# Patient Record
Sex: MALE | Race: WHITE | URBAN - METROPOLITAN AREA
[De-identification: names, ages, dates, MRNs, and addresses within clinical notes are randomized per-mention and may not be internally consistent; named-entity substitution may affect disease eponyms.]

---

## 2021-03-30 ENCOUNTER — IMMUNIZATIONS (OUTPATIENT)
Dept: FAMILY MEDICINE CLINIC | Facility: HOSPITAL | Age: 51
End: 2021-03-30

## 2021-03-30 DIAGNOSIS — Z23 ENCOUNTER FOR IMMUNIZATION: Primary | ICD-10-CM

## 2021-03-30 PROCEDURE — 0001A SARS-COV-2 / COVID-19 MRNA VACCINE (PFIZER-BIONTECH) 30 MCG: CPT

## 2021-03-30 PROCEDURE — 91300 SARS-COV-2 / COVID-19 MRNA VACCINE (PFIZER-BIONTECH) 30 MCG: CPT

## 2021-04-21 ENCOUNTER — IMMUNIZATIONS (OUTPATIENT)
Dept: FAMILY MEDICINE CLINIC | Facility: HOSPITAL | Age: 51
End: 2021-04-21

## 2021-04-21 DIAGNOSIS — Z23 ENCOUNTER FOR IMMUNIZATION: Primary | ICD-10-CM

## 2021-04-21 PROCEDURE — 0002A SARS-COV-2 / COVID-19 MRNA VACCINE (PFIZER-BIONTECH) 30 MCG: CPT

## 2021-04-21 PROCEDURE — 91300 SARS-COV-2 / COVID-19 MRNA VACCINE (PFIZER-BIONTECH) 30 MCG: CPT

## 2022-07-09 ENCOUNTER — EMERGENCY (EMERGENCY)
Facility: HOSPITAL | Age: 52
LOS: 1 days | Discharge: HOME | End: 2022-07-09
Attending: EMERGENCY MEDICINE
Payer: COMMERCIAL

## 2022-07-09 VITALS
TEMPERATURE: 98 F | OXYGEN SATURATION: 99 % | HEART RATE: 56 BPM | WEIGHT: 190.04 LBS | HEIGHT: 71 IN | SYSTOLIC BLOOD PRESSURE: 117 MMHG | DIASTOLIC BLOOD PRESSURE: 80 MMHG | RESPIRATION RATE: 17 BRPM

## 2022-07-09 VITALS
OXYGEN SATURATION: 99 % | HEART RATE: 48 BPM | DIASTOLIC BLOOD PRESSURE: 84 MMHG | RESPIRATION RATE: 18 BRPM | SYSTOLIC BLOOD PRESSURE: 127 MMHG

## 2022-07-09 LAB
ALBUMIN SERPL ELPH-MCNC: 4.8 G/DL — SIGNIFICANT CHANGE UP (ref 3.3–5)
ALP SERPL-CCNC: 60 U/L — SIGNIFICANT CHANGE UP (ref 40–120)
ALT FLD-CCNC: 24 U/L — SIGNIFICANT CHANGE UP (ref 10–45)
ANION GAP SERPL CALC-SCNC: 10 MMOL/L — SIGNIFICANT CHANGE UP (ref 5–17)
AST SERPL-CCNC: 21 U/L — SIGNIFICANT CHANGE UP (ref 10–40)
B BURGDOR C6 AB SER-ACNC: NEGATIVE — SIGNIFICANT CHANGE UP
B BURGDOR IGG+IGM SER-ACNC: 0.2 INDEX — SIGNIFICANT CHANGE UP (ref 0.01–0.89)
BILIRUB SERPL-MCNC: 0.3 MG/DL — SIGNIFICANT CHANGE UP (ref 0.2–1.2)
BUN SERPL-MCNC: 19 MG/DL — SIGNIFICANT CHANGE UP (ref 7–23)
CALCIUM SERPL-MCNC: 9.8 MG/DL — SIGNIFICANT CHANGE UP (ref 8.4–10.5)
CHLORIDE SERPL-SCNC: 102 MMOL/L — SIGNIFICANT CHANGE UP (ref 96–108)
CO2 SERPL-SCNC: 26 MMOL/L — SIGNIFICANT CHANGE UP (ref 22–31)
CREAT SERPL-MCNC: 0.73 MG/DL — SIGNIFICANT CHANGE UP (ref 0.5–1.3)
EGFR: 110 ML/MIN/1.73M2 — SIGNIFICANT CHANGE UP
GLUCOSE SERPL-MCNC: 99 MG/DL — SIGNIFICANT CHANGE UP (ref 70–99)
HCT VFR BLD CALC: 41.1 % — SIGNIFICANT CHANGE UP (ref 39–50)
HGB BLD-MCNC: 13.6 G/DL — SIGNIFICANT CHANGE UP (ref 13–17)
MCHC RBC-ENTMCNC: 26.4 PG — LOW (ref 27–34)
MCHC RBC-ENTMCNC: 33.1 GM/DL — SIGNIFICANT CHANGE UP (ref 32–36)
MCV RBC AUTO: 79.7 FL — LOW (ref 80–100)
NRBC # BLD: 0 /100 WBCS — SIGNIFICANT CHANGE UP (ref 0–0)
PLATELET # BLD AUTO: 173 K/UL — SIGNIFICANT CHANGE UP (ref 150–400)
POTASSIUM SERPL-MCNC: 5.3 MMOL/L — SIGNIFICANT CHANGE UP (ref 3.5–5.3)
POTASSIUM SERPL-SCNC: 5.3 MMOL/L — SIGNIFICANT CHANGE UP (ref 3.5–5.3)
PROT SERPL-MCNC: 7.4 G/DL — SIGNIFICANT CHANGE UP (ref 6–8.3)
RBC # BLD: 5.16 M/UL — SIGNIFICANT CHANGE UP (ref 4.2–5.8)
RBC # FLD: 12.8 % — SIGNIFICANT CHANGE UP (ref 10.3–14.5)
SODIUM SERPL-SCNC: 138 MMOL/L — SIGNIFICANT CHANGE UP (ref 135–145)
WBC # BLD: 5.32 K/UL — SIGNIFICANT CHANGE UP (ref 3.8–10.5)
WBC # FLD AUTO: 5.32 K/UL — SIGNIFICANT CHANGE UP (ref 3.8–10.5)

## 2022-07-09 PROCEDURE — 99284 EMERGENCY DEPT VISIT MOD MDM: CPT | Mod: 25

## 2022-07-09 PROCEDURE — 85027 COMPLETE CBC AUTOMATED: CPT

## 2022-07-09 PROCEDURE — 36415 COLL VENOUS BLD VENIPUNCTURE: CPT

## 2022-07-09 PROCEDURE — 93005 ELECTROCARDIOGRAM TRACING: CPT

## 2022-07-09 PROCEDURE — 86618 LYME DISEASE ANTIBODY: CPT

## 2022-07-09 PROCEDURE — 93010 ELECTROCARDIOGRAM REPORT: CPT | Mod: NC

## 2022-07-09 PROCEDURE — 87476 LYME DIS DNA AMP PROBE: CPT

## 2022-07-09 PROCEDURE — 99283 EMERGENCY DEPT VISIT LOW MDM: CPT

## 2022-07-09 PROCEDURE — 80053 COMPREHEN METABOLIC PANEL: CPT

## 2022-07-09 RX ADMIN — Medication 100 MILLIGRAM(S): at 12:07

## 2022-07-09 NOTE — ED PROVIDER NOTE - PATIENT PORTAL LINK FT
You can access the FollowMyHealth Patient Portal offered by [facility] by registering at the following website: [email or web address]. By joining [facility]’s FollowMyHealth portal, you will also be able to view your health information using other applications (apps) compatible with our system.

## 2022-07-09 NOTE — ED PROVIDER NOTE - ATTENDING CONTRIBUTION TO CARE
51yr M hx of HL on atorvastatin p/w tick bites. reports going for a run up in [facility] and then realized he had a tick bite, called his PCP and was prescribed a single ppx dose of doxy. today 7 days after initial bite, noticed one in his rt groin and when tried to grab it burst. noticed some blood. inspected his body again and could not find another one. denies f/c/fatigue, cp sob abd pain n.v diarrhea.  exam notable for well appearing, neuro grossly nl, clear lungs S1-2, no abd ttp or organomegaly. no peripheral edema.  discussed with pt regarding risks and benefits of a course of therapy and made a shared decision to start treatment. will f/u with his primary

## 2022-07-09 NOTE — ED PROVIDER NOTE - PHYSICAL EXAMINATION
Const: Well-nourished, Well-developed, appearing stated age.  Eyes: no conjunctival injection, and symmetrical lids.  HEENT: Head NCAT, no lesions. Atraumatic external nose and ears. Moist MM.  Neck: Symmetric, trachea midline.   CVS: +S1/S2,    RESP: Unlabored respiratory effort. Clear to auscultation bilaterally.  GI: Nontender/Nondistended   MSK: Normocephalic/Atraumatic   Skin: No targetoid rash. 1mm erythema to right thigh at site of tick bite, no residual tick parts.   Neuro: CNs II-XII grossly intact. Motor & Sensation grossly intact.  Psych: Awake, Alert, & Oriented (AAO) x3. Appropriate mood and affect.

## 2022-07-09 NOTE — ED PROVIDER NOTE - CLINICAL SUMMARY MEDICAL DECISION MAKING FREE TEXT BOX
Pt is a 51 m who presents with tick bite where tick was engorged and he removed today. will get lyme titers and start doxy course for repeated exposure.

## 2022-07-09 NOTE — ED PROVIDER NOTE - PROGRESS NOTE DETAILS
Jose Hoskins, PGY1 pt given doxy and lyme titers taken. pt would like to go home and check his Mychart for his blood work results. will dc with pmd f/u Jose Hoskins, PGY1 pt given doxy and lyme titers taken. ECG shows HR in 40's which pt states that he sees a cardiologist and this is his baseline.  pt would like to go home and check his Mychart for his blood work results. will dc with pmd f/u

## 2022-07-09 NOTE — ED PROVIDER NOTE - NSFOLLOWUPINSTRUCTIONS_ED_ALL_ED_FT
You were seen in the hospital for a tick bite.   Please  your medication from the pharmacy and take your antibiotic course.   Please look out for a target-like rash on your body as we discussed.  Please see your primary doctor in 2-3 days for follow-up care. Return to ER for any new or worsening symptoms including fevers or chills, nausea or vomiting, joint pains, or palpitations  Please read the handout on Tick Bites.   Check your mychart or call the hospital for your lab results.

## 2022-07-09 NOTE — ED ADULT NURSE REASSESSMENT NOTE - NS ED NURSE REASSESS COMMENT FT1
Pt for discharged HR is 48, clinically asymptomatic. States "my HR is always low, I'm always 48-52, because a I ran 10 miles everyday." MD Hoskins aware. States it's okay for pt to go home"

## 2022-07-09 NOTE — ED ADULT NURSE NOTE - OBJECTIVE STATEMENT
Male 51 years old alert and orientex4 came in for tick bite. Pt noticed tick this morning on his right inner thigh and he removed it. Has a tick on side of his left knee last week and was given 200 mg of Doxycycline. Denies fever, chest pain, sob, muscle pain or palpitations.

## 2022-07-09 NOTE — ED PROVIDER NOTE - OBJECTIVE STATEMENT
Pt is a 51 m who presents with tick bite where tick was engorged and he removed today. Pt denies any n/v, no f/c, no myalgia, cp, palpitations. Pt had a tick last week as well and was given 1 dose 200 of doxy.

## 2022-07-13 LAB — B BURGDOR DNA SPEC QL NAA+PROBE: NEGATIVE — SIGNIFICANT CHANGE UP

## 2024-06-18 DIAGNOSIS — Z00.00 ENCOUNTER FOR PREVENTIVE HEALTH EXAMINATION: Primary | ICD-10-CM

## 2024-09-05 ENCOUNTER — HOSPITAL ENCOUNTER (OUTPATIENT)
Dept: ULTRASOUND IMAGING | Facility: HOSPITAL | Age: 54
Discharge: HOME | End: 2024-09-05

## 2024-09-05 ENCOUNTER — OFFICE VISIT (OUTPATIENT)
Dept: FAMILY MEDICINE CLINIC | Facility: CLINIC | Age: 54
End: 2024-09-05

## 2024-09-05 ENCOUNTER — HOSPITAL ENCOUNTER (OUTPATIENT)
Dept: VASCULAR ULTRASOUND | Facility: HOSPITAL | Age: 54
Discharge: HOME | End: 2024-09-05

## 2024-09-05 ENCOUNTER — HOSPITAL ENCOUNTER (OUTPATIENT)
Dept: NON INVASIVE DIAGNOSTICS | Facility: CLINIC | Age: 54
Discharge: HOME | End: 2024-09-05

## 2024-09-05 ENCOUNTER — LAB (OUTPATIENT)
Dept: LAB | Facility: CLINIC | Age: 54
End: 2024-09-05

## 2024-09-05 ENCOUNTER — HOSPITAL ENCOUNTER (OUTPATIENT)
Dept: CT IMAGING | Facility: HOSPITAL | Age: 54
Discharge: HOME | End: 2024-09-05

## 2024-09-05 VITALS
DIASTOLIC BLOOD PRESSURE: 58 MMHG | SYSTOLIC BLOOD PRESSURE: 110 MMHG | HEIGHT: 70 IN | RESPIRATION RATE: 14 BRPM | HEART RATE: 49 BPM | TEMPERATURE: 97.8 F | WEIGHT: 198 LBS | BODY MASS INDEX: 28.35 KG/M2 | OXYGEN SATURATION: 100 %

## 2024-09-05 VITALS
HEART RATE: 49 BPM | BODY MASS INDEX: 28.34 KG/M2 | HEIGHT: 70 IN | WEIGHT: 197.97 LBS | SYSTOLIC BLOOD PRESSURE: 110 MMHG | DIASTOLIC BLOOD PRESSURE: 58 MMHG

## 2024-09-05 DIAGNOSIS — E78.00 PURE HYPERCHOLESTEROLEMIA: ICD-10-CM

## 2024-09-05 DIAGNOSIS — R73.03 PREDIABETES: ICD-10-CM

## 2024-09-05 DIAGNOSIS — Z00.00 ENCOUNTER FOR PREVENTIVE HEALTH EXAMINATION: ICD-10-CM

## 2024-09-05 DIAGNOSIS — E55.9 VITAMIN D DEFICIENCY: ICD-10-CM

## 2024-09-05 DIAGNOSIS — I65.23 CAROTID STENOSIS, ASYMPTOMATIC, BILATERAL: ICD-10-CM

## 2024-09-05 DIAGNOSIS — Z00.00 WELL ADULT EXAM: Primary | ICD-10-CM

## 2024-09-05 DIAGNOSIS — R82.90 ABNORMAL FINDING ON URINALYSIS: ICD-10-CM

## 2024-09-05 LAB
25(OH)D3 SERPL-MCNC: 24.9 NG/ML (ref 30–100)
ALBUMIN SERPL BCG-MCNC: 4.2 G/DL (ref 3.5–5)
ALP SERPL-CCNC: 38 U/L (ref 34–104)
ALT SERPL W P-5'-P-CCNC: 16 U/L (ref 7–52)
ANION GAP SERPL CALCULATED.3IONS-SCNC: 2 MMOL/L (ref 4–13)
AORTIC ROOT: 3.2 CM
APICAL FOUR CHAMBER EJECTION FRACTION: 73 %
ASCENDING AORTA: 3 CM
AST SERPL W P-5'-P-CCNC: 14 U/L (ref 13–39)
ATRIAL RATE: 49 BPM
BACTERIA UR QL AUTO: ABNORMAL /HPF
BASOPHILS # BLD AUTO: 0.02 THOUSANDS/ÂΜL (ref 0–0.1)
BASOPHILS NFR BLD AUTO: 0 % (ref 0–1)
BILIRUB SERPL-MCNC: 0.44 MG/DL (ref 0.2–1)
BILIRUB UR QL STRIP: NEGATIVE
BSA FOR ECHO PROCEDURE: 2.08 M2
BUN SERPL-MCNC: 17 MG/DL (ref 5–25)
CALCIUM SERPL-MCNC: 9.1 MG/DL (ref 8.4–10.2)
CHLORIDE SERPL-SCNC: 105 MMOL/L (ref 96–108)
CHOLEST SERPL-MCNC: 146 MG/DL
CLARITY UR: CLEAR
CO2 SERPL-SCNC: 28 MMOL/L (ref 21–32)
COLOR UR: ABNORMAL
CREAT SERPL-MCNC: 0.72 MG/DL (ref 0.6–1.3)
CRP SERPL HS-MCNC: 1.82 MG/L
E WAVE DECELERATION TIME: 129 MS
E/A RATIO: 1.25
EOSINOPHIL # BLD AUTO: 0.18 THOUSAND/ÂΜL (ref 0–0.61)
EOSINOPHIL NFR BLD AUTO: 4 % (ref 0–6)
ERYTHROCYTE [DISTWIDTH] IN BLOOD BY AUTOMATED COUNT: 13.1 % (ref 11.6–15.1)
EST. AVERAGE GLUCOSE BLD GHB EST-MCNC: 117 MG/DL
FRACTIONAL SHORTENING: 40 (ref 28–44)
GFR SERPL CREATININE-BSD FRML MDRD: 105 ML/MIN/1.73SQ M
GLUCOSE P FAST SERPL-MCNC: 97 MG/DL (ref 65–99)
GLUCOSE UR STRIP-MCNC: NEGATIVE MG/DL
HBA1C MFR BLD: 5.7 %
HCT VFR BLD AUTO: 41.5 % (ref 36.5–49.3)
HCV AB SER QL: NORMAL
HDLC SERPL-MCNC: 36 MG/DL
HGB BLD-MCNC: 13.4 G/DL (ref 12–17)
HGB UR QL STRIP.AUTO: NEGATIVE
IMM GRANULOCYTES # BLD AUTO: 0.02 THOUSAND/UL (ref 0–0.2)
IMM GRANULOCYTES NFR BLD AUTO: 0 % (ref 0–2)
INTERVENTRICULAR SEPTUM IN DIASTOLE (PARASTERNAL SHORT AXIS VIEW): 0.9 CM
INTERVENTRICULAR SEPTUM: 0.9 CM (ref 0.6–1.1)
KETONES UR STRIP-MCNC: NEGATIVE MG/DL
LAAS-AP2: 18.6 CM2
LAAS-AP4: 18.4 CM2
LDLC SERPL CALC-MCNC: 85 MG/DL (ref 0–100)
LEFT ATRIUM AREA SYSTOLE SINGLE PLANE A4C: 17.9 CM2
LEFT ATRIUM SIZE: 4 CM
LEFT ATRIUM VOLUME (MOD BIPLANE): 54 ML
LEFT INTERNAL DIMENSION IN SYSTOLE: 2.7 CM (ref 2.1–4)
LEFT VENTRICULAR INTERNAL DIMENSION IN DIASTOLE: 4.5 CM (ref 3.5–6)
LEFT VENTRICULAR POSTERIOR WALL IN END DIASTOLE: 0.9 CM
LEFT VENTRICULAR STROKE VOLUME: 66 ML
LEUKOCYTE ESTERASE UR QL STRIP: NEGATIVE
LVSV (TEICH): 66 ML
LYMPHOCYTES # BLD AUTO: 1.65 THOUSANDS/ÂΜL (ref 0.6–4.47)
LYMPHOCYTES NFR BLD AUTO: 33 % (ref 14–44)
MAX HR PERCENT: 93 %
MAX HR: 155 BPM
MCH RBC QN AUTO: 26.9 PG (ref 26.8–34.3)
MCHC RBC AUTO-ENTMCNC: 32.3 G/DL (ref 31.4–37.4)
MCV RBC AUTO: 83 FL (ref 82–98)
MONOCYTES # BLD AUTO: 0.42 THOUSAND/ÂΜL (ref 0.17–1.22)
MONOCYTES NFR BLD AUTO: 8 % (ref 4–12)
MUCOUS THREADS UR QL AUTO: ABNORMAL
MV E'TISSUE VEL-SEP: 12 CM/S
MV PEAK A VEL: 0.67 M/S
MV PEAK E VEL: 84 CM/S
MV STENOSIS PRESSURE HALF TIME: 37 MS
MV VALVE AREA P 1/2 METHOD: 5.9
NEUTROPHILS # BLD AUTO: 2.7 THOUSANDS/ÂΜL (ref 1.85–7.62)
NEUTS SEG NFR BLD AUTO: 55 % (ref 43–75)
NITRITE UR QL STRIP: NEGATIVE
NON-SQ EPI CELLS URNS QL MICRO: ABNORMAL /HPF
NRBC BLD AUTO-RTO: 0 /100 WBCS
P AXIS: 72 DEGREES
PH UR STRIP.AUTO: 5.5 [PH]
PLATELET # BLD AUTO: 167 THOUSANDS/UL (ref 149–390)
PMV BLD AUTO: 10.9 FL (ref 8.9–12.7)
POST LVEF: 70 %
POTASSIUM SERPL-SCNC: 5.2 MMOL/L (ref 3.5–5.3)
PR INTERVAL: 190 MS
PROT SERPL-MCNC: 6.8 G/DL (ref 6.4–8.4)
PROT UR STRIP-MCNC: NEGATIVE MG/DL
PSA SERPL-MCNC: 0.72 NG/ML (ref 0–4)
QRS AXIS: 24 DEGREES
QRSD INTERVAL: 110 MS
QT INTERVAL: 426 MS
QTC INTERVAL: 384 MS
RATE PRESSURE PRODUCT: NORMAL
RBC # BLD AUTO: 4.99 MILLION/UL (ref 3.88–5.62)
RBC #/AREA URNS AUTO: ABNORMAL /HPF
RIGHT ATRIUM AREA SYSTOLE A4C: 13.2 CM2
RIGHT VENTRICLE ID DIMENSION: 3.1 CM
SL CV LEFT ATRIUM LENGTH A2C: 5.2 CM
SL CV LV EF: 60
SL CV LV EF: 60
SL CV PED ECHO LEFT VENTRICLE DIASTOLIC VOLUME (MOD BIPLANE) 2D: 94 ML
SL CV PED ECHO LEFT VENTRICLE SYSTOLIC VOLUME (MOD BIPLANE) 2D: 28 ML
SL CV STRESS RECOVERY BP: NORMAL MMHG
SL CV STRESS RECOVERY HR: 64 BPM
SL CV STRESS RECOVERY O2 SAT: 100 %
SL CV STRESS STAGE REACHED: 5
SODIUM SERPL-SCNC: 135 MMOL/L (ref 135–147)
SP GR UR STRIP.AUTO: 1.02 (ref 1–1.03)
STRESS ANGINA INDEX: 0
STRESS BASELINE BP: NORMAL MMHG
STRESS BASELINE HR: 48 BPM
STRESS O2 SAT REST: 100 %
STRESS PEAK HR: 153 BPM
STRESS POST ESTIMATED WORKLOAD: 14.2 METS
STRESS POST EXERCISE DUR MIN: 8 MIN
STRESS POST EXERCISE DUR SEC: 30 SEC
STRESS POST O2 SAT PEAK: 100 %
STRESS POST PEAK BP: 162 MMHG
T WAVE AXIS: 54 DEGREES
TRICUSPID ANNULAR PLANE SYSTOLIC EXCURSION: 2.3 CM
TRIGL SERPL-MCNC: 125 MG/DL
TSH SERPL DL<=0.05 MIU/L-ACNC: 2.14 UIU/ML (ref 0.45–4.5)
UROBILINOGEN UR STRIP-ACNC: <2 MG/DL
VENTRICULAR RATE: 49 BPM
WBC # BLD AUTO: 4.99 THOUSAND/UL (ref 4.31–10.16)
WBC #/AREA URNS AUTO: ABNORMAL /HPF

## 2024-09-05 PROCEDURE — 84153 ASSAY OF PSA TOTAL: CPT

## 2024-09-05 PROCEDURE — 93350 STRESS TTE ONLY: CPT

## 2024-09-05 PROCEDURE — 93306 TTE W/DOPPLER COMPLETE: CPT

## 2024-09-05 PROCEDURE — 80053 COMPREHEN METABOLIC PANEL: CPT

## 2024-09-05 PROCEDURE — 93922 UPR/L XTREMITY ART 2 LEVELS: CPT

## 2024-09-05 PROCEDURE — 84443 ASSAY THYROID STIM HORMONE: CPT

## 2024-09-05 PROCEDURE — VASC: Performed by: SURGERY

## 2024-09-05 PROCEDURE — 83036 HEMOGLOBIN GLYCOSYLATED A1C: CPT

## 2024-09-05 PROCEDURE — 86803 HEPATITIS C AB TEST: CPT

## 2024-09-05 PROCEDURE — 82306 VITAMIN D 25 HYDROXY: CPT

## 2024-09-05 PROCEDURE — 80061 LIPID PANEL: CPT

## 2024-09-05 PROCEDURE — 93306 TTE W/DOPPLER COMPLETE: CPT | Performed by: INTERNAL MEDICINE

## 2024-09-05 PROCEDURE — 36415 COLL VENOUS BLD VENIPUNCTURE: CPT

## 2024-09-05 PROCEDURE — 76700 US EXAM ABDOM COMPLETE: CPT

## 2024-09-05 PROCEDURE — 93010 ELECTROCARDIOGRAM REPORT: CPT | Performed by: INTERNAL MEDICINE

## 2024-09-05 PROCEDURE — 85025 COMPLETE CBC W/AUTO DIFF WBC: CPT

## 2024-09-05 PROCEDURE — 93005 ELECTROCARDIOGRAM TRACING: CPT

## 2024-09-05 PROCEDURE — 99499EX: Performed by: INTERNAL MEDICINE

## 2024-09-05 PROCEDURE — 81001 URINALYSIS AUTO W/SCOPE: CPT

## 2024-09-05 PROCEDURE — 86141 C-REACTIVE PROTEIN HS: CPT

## 2024-09-05 PROCEDURE — 93350 STRESS TTE ONLY: CPT | Performed by: INTERNAL MEDICINE

## 2024-09-05 RX ORDER — ACETAMINOPHEN 160 MG
2000 TABLET,DISINTEGRATING ORAL DAILY
Start: 2024-09-05

## 2024-09-05 RX ORDER — ATORVASTATIN CALCIUM 10 MG/1
10 TABLET, FILM COATED ORAL DAILY
COMMUNITY
Start: 2016-03-26

## 2024-09-05 NOTE — PROGRESS NOTES
[facility] Physical Exam     Arelis Mayes is a 54 y.o. male who is presenting for an [facility] Physical Exam at [facility]. This is Arelis's first [facility] physical. Mr. Mayes is a . Arelis had a previous executive physical at [facility] in [place] in 2022, which included an EKG, echocardiogram and a coronary CT. The coronary CT score was 0.     Arelis has enjoyed good health overall throughout his life. His past medical history is significant for hyperlipemia, obstructive sleep apnea, obesity and cholecystitis. Hyperlipidemia is well controlled with lifestyle improvement and Atorvastatin 10 mg per day. Obstructive Sleep Apnea is well controlled with weight loss and previous uvulectomy/palatoplasty. He had uncomplicated Hepatitis A as a child.    Past surgical history includes a laparoscopic cholecystectomy in 2010 and uvulopalatoplasty in 2015. Arelis underwent a screening colonoscopy in 2022.     His family history is significant for coronary artery disease requiring PTCA in his father and cancer of the bladder, gallbladder, dementia and autoimmune disease with associated thrombocytopenia in his mother. Both of his parents are alive and in their 90's. Arelis has 2 brothers, one of whom has hypertension. His paternal grandfather has a history of prostate cancer.    Arelis is . His wife is a retired Obstetrician/Gynecologist. They have a son and a daughter who are in grade school. He has no history of smoking but had second hand smoke exposure from his mother, which ended 35 years ago. Alcohol intake is 1 drink every 2 weeks. Caffeine intake is 2-3 espresso's per day. He runs 6-7 miles 5-6 days per week and lifts weights. Arelis's diet is healthy, including little red meat and plenty of vegetables and fruit.     Allergies: no know allergies to medications    Medications: Atorvastatin 10 mg orally once a day    Immunizations: Hepatitis B: 1999;  "TdaP: 2022; Shingrix x1: 8/2022    Review of Systems   Constitutional: Negative.    HENT: Negative.     Eyes: Negative.    Respiratory:  Negative for apnea, cough, chest tightness and shortness of breath.         Does snore, but no witnessed apnea.    Cardiovascular:  Negative for chest pain, palpitations and leg swelling.        History of atypical chest pain. History of asymptomatic bradycardia due to conditioning.    Gastrointestinal: Negative.    Musculoskeletal: Negative.    Skin:         Recent blood blister of bottom of foot, with exam in [place] by a Dermatologist while traveling recently.    Allergic/Immunologic: Negative for environmental allergies, food allergies and immunocompromised state.   Neurological:  Negative for headaches.   Hematological: Negative.    Psychiatric/Behavioral:  Positive for sleep disturbance. Negative for dysphoric mood. The patient is not nervous/anxious.         Occasional insomnia         PHQ-2/9 Depression Screening    Little interest or pleasure in doing things: 0 - not at all  Feeling down, depressed, or hopeless: 0 - not at all  PHQ-2 Score: 0  PHQ-2 Interpretation: Negative depression screen           Active Ambulatory Problems     Diagnosis Date Noted    Pure hypercholesterolemia 09/05/2024     Resolved Ambulatory Problems     Diagnosis Date Noted    No Resolved Ambulatory Problems     No Additional Past Medical History       No past surgical history on file.    No family history on file.    Social History     Tobacco Use   Smoking Status Not on file   Smokeless Tobacco Not on file         Current Outpatient Medications:     atorvastatin (LIPITOR) 10 mg tablet, Take 10 mg by mouth daily, Disp: , Rfl:           Objective:         Physical Exam      BP: 110/58  P: 49  RR: 14  T: 97.8  O2%: 100    Ht: 5'10\" Wt: 198.0 lbs      VA unaided:  OD: 20/20   OS: 20/30    Pleasant, alert and oriented and in no acute distress.  Affect: normal  HEENT:  NCAT  No adenopathy " throughout  Scalp normal  Eyes:  Pupils 3 mm with normal direct and consensual light responses, extraocular eye movements intact. There is no conjunctival pallor or scleral icterus.  Ophthalmoscopic exam: no iritis or conjunctivitis, normal anterior chambers, no opacities, normal vasculature, no hemorrhages or exudates, normal optic disc  Ears:  No deformity of external ears.  No pain on manipulation of the tragus.  No mastoid redness, swelling, tenderness or drainage.  External auditory canals: No abnormalities  Tympanic membranes:  No trauma, no retraction or rupture, no bulging or erythema, no air-fluid level, no cholesteatoma  Nose:  No trauma or deformity.  The canals are patent with normal mucous membranes, no polyps or masses. There is no nasal discharge. There is no septal deviation.  Oral cavity and throat:  Normal dentition with no evidence of gum disease. mucous membranes are normal.  Normal appearance of Stensen's and Lui's ducts.  The airway is patent.  There is no swelling or other abnormality of the uvula  Salivary glands:  Normal on inspection and palpation.  There are no hemorrhages or exudates of the throat.  Tonsils are involuted.  There is no submandibular adenopathy.  There is no submental adenopathy.    Neck:  Supple, full range of motion, nontender, no deformity or trauma.  There is no cervical adenopathy. There is no supraclavicular adenopathy  Carotid arteries:  Normal upstroke and descent without bruit.  There is no JVD.  Trachea is midline without stridor.  Thyroid:  Normal on inspection and palpation.    Chest:  No trauma or deformity, no supraclavicular adenopathy, no subclavian bruits.  Surgical scars: None.  Chest wall expansion is symmetric normal.  Lungs:  There is no tachypnea or dyspnea, no use of accessory muscles of respiration.  Breath sounds are normal and equal throughout, expiratory phase of respiration is not prolonged.  Exam includes no wheezing or rales, no focal  decreased breath sounds, no dullness to percussion.    Heart:  Regular rate and rhythm, normal S1 and S2, no S4 or S3. Murmurs:  None.  No cardiac rub.  PMI 5th intercostal space, midclavicular line, no right ventricular heave.    Back:  No trauma, no kyphosis or scoliosis, no CVA mass or tenderness, no spinal tenderness.    Abdomen:  Nondistended, normal bowel sounds, soft, nontender without masses or organomegaly. There is no pulsatile mass or bruit.  Hernias: none  Surgical Scars: horizontal transverse mid- line high epigastric scar with keloid formation    Hypogastric:  No distention, no tympany or tenderness over the bladder, no mass.  Inguinal Region: There is no inguinal mass or adenopathy, no testicular mass      Arterial circulation:  +2/2 brachial, radial and ulnar pulses bilaterally. +2/2 femoral, popliteal, posterior tibial and dorsalis pedis pulses bilaterally.    Extremities:  No clubbing or cyanosis are present.  There is no upper extremity edema. There is no lower extremity edema. there are no varicosities   bilaterally. No phlebitic findings or calf tenderness.    Joints: no significant deformity, no synovitis, normal function, no tophi    Skin:  No rash or skin malignancy.  Nails:  No mycotic changes or other abnormalities.    Neurologic:  Cranial nerves 2-12 are grossly intact, there is no dysmetria, there is no neurologic gait abnormality.  There is no spasticity.  There is no tremor.  There is no masked faces, no bradykinesia or rigidity.  Sensory deficit: None.  Strength symmetric normal 5/5 throughout.  Reflexes:  +2 biceps, triceps, patellar and Achilles, Babinski response downgoing bilaterally.    Cognitive exam:  orientation as above.  Executive functions:  intact. Short-term memory:  intact.    Long-term memory:  intact. Language: fluent.             Assessment/Plan:     1. Well adult exam    Ramzi, your health is very good, as is your lifestyle. Continue your running program, low  carbohydrate intake and intake of lean proteins and vegetables.     Please refer to the notes from Dermatology and Cardiology as well.     I reviewed your vaccine records. I recommend that you receive a Flu shot this Fall. I also recommend re vaccination with the Shingrix 2 step vaccine series. This is available at your local pharmacy.     Given your family history of prostate cancer in your grandfather, continue annual PSA measurements.     Given that your colonoscopy in 2022 was normal, this test should be repeated next in 2032.     As we discussed, I would like to see you in my office for a follow up visit in 6 months. My office will contact you to schedule this visit. I have entered your lab orders for this visit. I would like you to have them performed about 1 week prior to your next visit.      2. Pure hypercholesterolemia    Continue Atorvastatin 10 mg per day.  We will recheck your lipid panel in 6 months.  Repeat coronary calcium measurements every 5 years (next due on 2027).    3. Vitamin D deficiency    Your Vitamin D level is slightly low at 24.9.  Start taking over the counter Vitamin D3, 2000 IU per day.       4. Prediabetes    Your HbA1c is just above the ideal range. Your risk for progression to diabetes is low. Please make adjustments in your diet as recommended by the dietitian. Continue regular exercise. We will recheck this test again in 6 months.         5. Carotid stenosis, asymptomatic, bilateral  You have mild plaque deposition in your carotid arteries. To prevent progression, continue Atorvastatin, regular exercise, good blood pressure control through lifestyle and try to decrease your weight by 5 lbs.  We will recheck your carotid ultrasound again in 1 year.       6. Abnormal finding on urinalysis  As we discussed, this is a benign finding. We will check a mid-stream urinalysis again in 6 months.     - UA (URINE) with reflex to Scope; Future      Rammona,     It was a pleasure to see you  again! My office will contact you this week to register you as a new patient. We will schedule a follow up visit with me in 6 months. If you have any questions about today's exam, please feel free to contact me. We hope to see you back at [facility] in the future.       Best Regards,      Kee Doyle MD, FACP   of Medicine  [place]

## 2024-09-05 NOTE — PROGRESS NOTES
"  Your [facility] Board-Certified Dermatologist's Name: Wendie Santacruz MD    Skin Screening Exam:    A chaperone was present throughout the entire encounter.      SKIN:  FULL ORGAN SYSTEM EXAM   Hair, Scalp, Ears, Face Normal except as noted below in Assessment   Neck, Cervical Chain Nodes Normal except as noted below in Assessment   Right Arm/Hand/Fingers Normal except as noted below in Assessment   Left Arm/Hand/Fingers Normal except as noted below in Assessment   Chest/Breasts/Axillae Did the patient specifically refuse to have the areas \"under-the-bra\" examined by the Dermatologist? No  Examined areas normal except as noted below in Assessment   Abdomen, Umbilicus Normal except as noted below in Assessment   Back/Spine Normal except as noted below in Assessment   Groin/Genitalia/Buttocks Did the patient specifically refuse to have the areas \"under-the-underwear\" examined by the Dermatologist? No  Examined areas normal except as noted below in Assessment   Right Leg, Foot, Toes Normal except as noted below in Assessment   Left Leg, Foot, Toes Normal except as noted below in Assessment        Assessment and Plan by Diagnosis:    Use a moisturizer + sunscreen \"combo\" product such as Neutrogena Daily Defense SPF 50+ or CeraVe AM at least three times a day.  Follow-up with your private dermatologist or one of our board-certified [facility] Dermatologists as discussed.  [facility]'s own Dr. Xiomy Marquez is available for consultation for skin enhancement and revitalization services; please mention \"[facility]\" for expedited scheduling    NEOPLASM OF UNCERTAIN BEHAVIOR OF SKIN    Physical Exam:  (Anatomic Location); (Size and Morphological Description); (Differential Diagnosis):  Right posterior leg: 0.4 x 0.4 cm brown papule benign appearing on dermoscopy: likely nevus  Pertinent Positives:  Pertinent Negatives:    Additional History of Present Condition:  Wife noticed it last week    Assessment and " "Plan:  I have discussed with the patient that a sample of skin via a \"skin biopsy” would be potentially helpful to further make a specific diagnosis under the microscope.  Based on a thorough discussion of this condition and the management approach to it (including a comprehensive discussion of the known risks, side effects and potential benefits of treatment), the patient (family) agrees to implement the following specific plan:    [facility] TEAM WILL EMAIL YANICK ROMERO TO SCHEDULE APPOINTMENT WITH DR. MCKEON WITHIN 1 MONTH AS OVERBOOK FOR BIOPSY      "

## 2024-09-05 NOTE — PROGRESS NOTES
Nutritional Summary and Recommendations:    Patient Nutrition-Oriented Medical/Diet Hx  Arelis presents today to [facility] for nutrition assessment and counseling. Arelis reports typically eating 1-2 meals/day. Does not like a lot of meat, eats a lot of salads. Discussion focused on adequate protein intake, plant-based protein sources, and ways to reduce A1C lab. Labs reviewed.        Labs:  A1C 5.7  Total Cholesterol 146  Triglycerides 125  HDL 36  LDL 85  Vitamin D 24.9    Anthropometrics:     Ht: 5 ft 10.5 in Wt: 197.2 lb   BMI: 27.9     BMR: 1831 kcals  Fat%: 26.6% Acceptable Range: 11-22%     Fat Mass: 52.4 lb FFM: 144.8 lb  TBW: 106 lb      Estimated Energy Requirements:  6826-5606 kcals/day   70-90 grams of protein per day    Nutrition Diagnosis:  Altered nutrition related laboratory values  r/t physiological events as evidenced by A1C 5.7%      Nutrition Interventions:  Read food labels for grams of added sugar, limit daily intake to no more than 36 grams (9 teaspoons) per day.  Incorporate a protein shake in the morning, aim for 20-30 grams per serving    Nutrition Recommendations:    Reduce A1C levels <5.7% within 3-6 months by reducing added sugar and simple carbohydrates in diet.  Incorporate a lean protein at every meal and snack.  Increase serum Vitamin D levels to >30 ng/dL by supplementing with 2000 IU Vitamin D3 daily.  Improve body composition by next visit.   Contact RD with any questions or concerns.    Nutrition Education     Pre- Diabetes Nutrition Education and Healthy Snacking       Perceived Comprehension:   Good    Expected Compliance:  Good

## 2024-09-05 NOTE — PROGRESS NOTES
Fitness Summary and Recommendations: Arelis scored at the 25% on his body composition assessment with a bodyfat % of 26.6%. Arelis scored in the suboptimal range for flexibility with a sit & reach score of 1 cm. His Muscle Strength/Endurance scores placed him at the 50% (lower body) and 80% (upper body) with a chair stand score of 21 and arm curl score of 25 respectively. Arelis's Cardiovascular Score of 60.3 placed him at the 95%. Overall, Arelis would be considered to have an average fitness level with an 51% score. Continued emphasis on regular exercise and sound nutrition practices will enable Arelis to reach his optimal level of fitness.

## 2024-09-05 NOTE — PROGRESS NOTES
Hearing Assessment Summary:   Hearing Screening    250Hz 500Hz 1000Hz 2000Hz 4000Hz 8000Hz   Right ear 20 15 15 10 20 10   Left ear 20 10 10 10 10 15   Comments: HEARING EVALUATION    Name:  Arelis Mayes  :  1970  Age:  54 y.o.   MRN:  [id number]  Date of Evaluation: 24     History: [facility] Exam  Reason for visit: Arelis Mayes is being seen today for an evaluation of hearing as part of an [facility] examination.  patient does not report any difficulty hearing. He notes some history of noise exposure. Patient denies otalgia, otorrhea, dizziness, fullness, and tinnitus.      EVALUATION:    Otoscopic Evaluation:   Right Ear: Clear and healthy ear canal and tympanic membrane   Left Ear: Clear and healthy ear canal and tympanic membrane    Tympanometry:   Right: Type A - normal middle ear pressure and compliance   Left: Type A - normal middle ear pressure and compliance    Audiogram Results:   Pure tone testing revealed normal hearing sensitivity bilaterally. SRT and PTA are in agreement indicating good test reliability. Word recognition scores were excellent bilaterally.     *see attached audiogram      RECOMMENDATIONS:  Return to [facility]. for F/U    PATIENT EDUCATION:   Discussed results and recommendations with patient.  Questions were addressed and the patient was encouraged to contact our department should concerns arise.      Jessica Corral., CCC-A  Clinical Audiologist'

## 2024-09-05 NOTE — PROGRESS NOTES
Heart and Vascular Summary:    Baseline ECG: Sinus bradycardia, otherwise normal ECG.  Your heart rate is slow, which is a sign of good conditioning of your heart.      Echocardiogram: Normal right and left ventricular size and function.  Normal valvular structure and function.  There is mild atherosclerosis of the aortic valve, which happens with age.  Your statin medication will help decelerate the rate at which this builds up over time.      Stress Echocardiogram: Excellent exercise capacity (8 mins and 30 secs) on the accelerated stress test protocol, achieving 14.2 METS, and 93% of maximal predicted heart rate.  You had no changes on the ECG portion to suggest ischemia or blockage.  Blood pressure was normal at the start of the test, with a normal response to exercise (peak blood pressure of 162 systolic).  You had a good heart rate recovery after exercise.  Normal baseline left and right ventricular wall motion and function on echocardiogram with no wall motion abnormalities seen at peak stress to suggest any significant blockages in your coronary arteries over 50% that may require revascularization.        Lipid Profile: this revealed a total cholesterol of 146, LDL of 85, HDL of 36, and a Triglyceride level of 125. The total cholesterol is a sum of its individual parts.  The HDL is the good cholesterol, which is protective to your heart.  Your level is slightly below your goal of over 40.  The only way to raise this level is with increased activity/exercise.  The Triglycerides are a marker of your diet and genetics.  Less than 150 is what we aim for, and your level is within that range.  Reducing the simple sugars and carbohydrates in your diet will help reduce this value.  The LDL is the bad cholesterol, the cholesterol that builds atherosclerotic plaque in your arteries.  Your level is at your goal of less than 120, which means that your statin medication is working well for you.  Reducing the trans-fats  and saturated fats in your diet along with increasing exercise can help improve this value.

## 2024-09-06 LAB
CHEST PAIN STATEMENT: NORMAL
MAX DIASTOLIC BP: 80 MMHG
MAX PREDICTED HEART RATE: 166 BPM
PROTOCOL NAME: NORMAL
STRESS POST EXERCISE DUR MIN: 8 MIN
STRESS POST EXERCISE DUR SEC: 30 SEC
STRESS POST PEAK HR: 155 BPM
STRESS POST PEAK SYSTOLIC BP: 162 MMHG
TARGET HR FORMULA: NORMAL
TEST INDICATION: NORMAL

## 2024-09-11 ENCOUNTER — OFFICE VISIT (OUTPATIENT)
Dept: DERMATOLOGY | Facility: CLINIC | Age: 54
End: 2024-09-11
Payer: COMMERCIAL

## 2024-09-11 VITALS — TEMPERATURE: 97.3 F | BODY MASS INDEX: 28.24 KG/M2 | WEIGHT: 197.3 LBS | HEIGHT: 70 IN

## 2024-09-11 DIAGNOSIS — D48.9 NEOPLASM OF UNCERTAIN BEHAVIOR: Primary | ICD-10-CM

## 2024-09-11 PROCEDURE — 88305 TISSUE EXAM BY PATHOLOGIST: CPT | Performed by: STUDENT IN AN ORGANIZED HEALTH CARE EDUCATION/TRAINING PROGRAM

## 2024-09-11 PROCEDURE — 11102 TANGNTL BX SKIN SINGLE LES: CPT | Performed by: STUDENT IN AN ORGANIZED HEALTH CARE EDUCATION/TRAINING PROGRAM

## 2024-09-11 PROCEDURE — 99204 OFFICE O/P NEW MOD 45 MIN: CPT | Performed by: STUDENT IN AN ORGANIZED HEALTH CARE EDUCATION/TRAINING PROGRAM

## 2024-09-11 PROCEDURE — 88342 IMHCHEM/IMCYTCHM 1ST ANTB: CPT | Performed by: STUDENT IN AN ORGANIZED HEALTH CARE EDUCATION/TRAINING PROGRAM

## 2024-09-11 PROCEDURE — 88341 IMHCHEM/IMCYTCHM EA ADD ANTB: CPT | Performed by: STUDENT IN AN ORGANIZED HEALTH CARE EDUCATION/TRAINING PROGRAM

## 2024-09-11 NOTE — PATIENT INSTRUCTIONS
"NEOPLASM OF UNCERTAIN BEHAVIOR OF SKIN      Assessment and Plan:  I have discussed with the patient that a sample of skin via a \"skin biopsy” would be potentially helpful to further make a specific diagnosis under the microscope.  Based on a thorough discussion of this condition and the management approach to it (including a comprehensive discussion of the known risks, side effects and potential benefits of treatment), the patient (family) agrees to implement the following specific plan:    Procedure:  Skin Biopsy.  After a thorough discussion of treatment options and risk/benefits/alternatives (including but not limited to local pain, scarring, dyspigmentation, blistering, possible superinfection, and inability to confirm a diagnosis via histopathology), verbal and written consent were obtained and portion of the rash was biopsied for tissue sample.  See below for consent that was obtained from patient and subsequent Procedure Note.       PROCEDURE TANGENTIAL (SHAVE) BIOPSY NOTE:        INFORMED CONSENT DISCUSSION AND POST-OPERATIVE INSTRUCTIONS FOR PATIENT    I.  RATIONALE FOR PROCEDURE  I understand that a skin biopsy allows the Dermatologist to test a lesion or rash under the microscope to obtain a diagnosis.  It usually involves numbing the area with numbing medication and removing a small piece of skin; sometimes the area will be closed with sutures. In this specific procedure, sutures are not usually needed.  If any sutures are placed, then they are usually need to be removed in 2 weeks or less.    I understand that my Dermatologist recommends that a skin \"shave\" biopsy be performed today.  A local anesthetic, similar to the kind that a dentist uses when filling a cavity, will be injected with a very small needle into the skin area to be sampled.  The injected skin and tissue underneath \"will go to sleep” and become numb so no pain should be felt afterwards.  An instrument shaped like a tiny \"razor blade\" " "(shave biopsy instrument) will be used to cut a small piece of tissue and skin from the area so that a sample of tissue can be taken and examined more closely under the microscope.  A slight amount of bleeding will occur, but it will be stopped with direct pressure and a pressure bandage and any other appropriate methods.  I understands that a scar will form where the wound was created.  Surgical ointment will be applied to help protect the wound.  Sutures are not usually needed.    II.  RISKS AND POTENTIAL COMPLICATIONS   I understand the risks and potential complications of a skin biopsy include but are not limited to the following:  Bleeding  Infection  Pain  Scar/keloid  Skin discoloration  Incomplete Removal  Recurrence  Nerve Damage/Numbness/Loss of Function  Allergic Reaction to Anesthesia  Biopsies are diagnostic procedures and based on findings additional treatment or evaluation may be required  Loss or destruction of specimen resulting in no additional findings    My Dermatologist has explained to me the nature of the condition, the nature of the procedure, and the benefits to be reasonably expected compared with alternative approaches.  My Dermatologist has discussed the likelihood of major risks or complications of this procedure including the specific risks listed above, such as bleeding, infection, and scarring/keloid.  I understand that a scar is expected after this procedure.  I understand that my physician cannot predict if the scar will form a \"keloid,\" which extends beyond the borders of the wound that is created.  A keloid is a thick, painful, and bumpy scar.  A keloid can be difficult to treat, as it does not always respond well to therapy, which includes injecting cortisone directly into the keloid every few weeks.  While this usually reduces the pain and size of the scar, it does not eliminate it.      I understand that photographs may be taken before and after the procedure.  These will be " "maintained as part of the medical providers confidential records and may not be made available to me.  I further authorize the medical provider to use the photographs for teaching purposes or to illustrate scientific papers, books, or lectures if in his/her judgment, medical research, education, or science may benefit from its use.    I have had an opportunity to fully inquire about the risks and benefits of this procedure and its alternatives.   I have been given ample time and opportunity to ask questions and to seek a second opinion if I wished to do so.  I acknowledge that there have specifically been no guarantees as to the cosmetic results from the procedure.  I am aware that with any procedure there is always the possibility of an unexpected complication.    III. POST-PROCEDURAL CARE (WHAT YOU WILL NEED TO DO \"AFTER THE BIOPSY\" TO OPTIMIZE HEALING)    Keep the area clean and dry.  Try NOT to remove the bandage or get it wet for the first 24 hours.    Gently clean the area and apply surgical ointment (such as Vaseline petrolatum ointment, which is available \"over the counter\" and not a prescription) to the biopsy site for up to 2 weeks straight.  This acts to protect the wound from the outside world.      Sutures are not usually placed in this procedure.  If any sutures were placed, return for suture removal as instructed (generally 1 week for the face, 2 weeks for the body).      Take Acetaminophen (Tylenol) for discomfort, if no contraindications.  Ibuprofen or aspirin could make bleeding worse.    Call our office immediately for signs of infection: fever, chills, increased redness, warmth, tenderness, discomfort/pain, or pus or foul smell coming from the wound.    WHAT TO DO IF THERE IS ANY BLEEDING?  If a small amount of bleeding is noticed, place a clean cloth over the area and apply firm pressure for ten minutes.  Check the wound after 10 minutes of direct pressure.  If bleeding persists, try one more " time for an additional 10 minutes of direct pressure on the area.  If the bleeding becomes heavier or does not stop after the second attempt, or if you have any other questions about this procedure, then please call your [facility] Dermatologist by calling [phone] (SKIN).     I hereby acknowledge that I have reviewed and verified the site with my Dermatologist and have requested and authorized my Dermatologist to proceed with the procedure.

## 2024-09-11 NOTE — PROGRESS NOTES
"[facility] Note     Patient Name: Arelis Mayes  Encounter Date: 9/11/2024     Have you been cared for by a [facility] Dermatologist in the last 3 years and, if so, which description applies to you?    NO.   I am considered a \"new\" patient and must complete all patient intake questions. I am MALE/not capable of bearing children.    REVIEW OF SYSTEMS:  Have you recently had or currently have any of the following? Recent fever or chills? No  Any non-healing wound? No   PAST MEDICAL HISTORY:  Have you personally ever had or currently have any of the following?  If \"YES,\" then please provide more detail. Skin cancer (such as Melanoma, Basal Cell Carcinoma, Squamous Cell Carcinoma?  No  Tuberculosis, HIV/AIDS, Hepatitis B or C: No  Radiation Treatment No   HISTORY OF IMMUNOSUPPRESSION:   Do you have a history of any of the following:  Systemic Immunosuppression such as Diabetes, Biologic or Immunotherapy, Chemotherapy, Organ Transplantation, Bone Marrow Transplantation or Prednisone?  No     Answering \"YES\" requires the addition of the dotphrase \"IMMUNOSUPPRESSED\" as the first diagnosis of the patient's visit.   FAMILY HISTORY:  Any \"first degree relatives\" (parent, brother, sister, or child) with the following?    Skin Cancer, Pancreatic or Other Cancer? No   PATIENT EXPERIENCE:    Do you want the Dermatologist to perform a COMPLETE skin exam today including a clinical examination under the \"bra and underwear\" areas?  No  If necessary, do we have your permission to call and leave a detailed message on your Preferred Phone number that includes your specific medical information?  Yes      No Known Allergies   Current Outpatient Medications:     atorvastatin (LIPITOR) 10 mg tablet, Take 10 mg by mouth daily, Disp: , Rfl:     Cholecalciferol (Vitamin D3) 50 MCG (2000 UT) capsule, Take 1 capsule (2,000 Units total) by mouth daily, Disp: , Rfl:           Whom besides the patient is providing clinical " "information about today's encounter?   NO ADDITIONAL HISTORIAN (patient alone provided history)    Physical Exam and Assessment/Plan by Diagnosis:    NEOPLASM OF UNCERTAIN BEHAVIOR OF SKIN    Physical Exam:  (Anatomic Location); (Size and Morphological Description); (Differential Diagnosis):  Specimen A: right posterior knee; 0.2cm x 0.2cm brown papule SK vs Nevus vs Atypical Nevus   Pertinent Positives:  Pertinent Negatives:    Additional History of Present Condition:   Patient presents a SOC on Right calf that Dr. Santacruz mention getting BX on 9/5/2024. The spot appeared a couple of weeks ago.     Assessment and Plan:  I have discussed with the patient that a sample of skin via a \"skin biopsy” would be potentially helpful to further make a specific diagnosis under the microscope.  Based on a thorough discussion of this condition and the management approach to it (including a comprehensive discussion of the known risks, side effects and potential benefits of treatment), the patient (family) agrees to implement the following specific plan:    Procedure:  Skin Biopsy.  After a thorough discussion of treatment options and risk/benefits/alternatives (including but not limited to local pain, scarring, dyspigmentation, blistering, possible superinfection, and inability to confirm a diagnosis via histopathology), verbal and written consent were obtained and portion of the rash was biopsied for tissue sample.  See below for consent that was obtained from patient and subsequent Procedure Note.       PROCEDURE TANGENTIAL (SHAVE) BIOPSY NOTE:    Performing Physician:   Anatomic Location; Clinical Description with size (cm); Pre-Op Diagnosis:   Specimen A: right posterior knee; 0.2cm x 0.2cm brown papule SK vs Nevus vs Atypical Nevus  Post-op diagnosis: Same     Local anesthesia: 1% xylocaine with epi      Topical anesthesia: None    Hemostasis: Aluminum chloride       After obtaining informed consent  at which time " "there was a discussion about the purpose of biopsy  and low risks of infection and bleeding.  The area was prepped and draped in the usual fashion. Anesthesia was obtained with 1% lidocaine with epinephrine. A shave biopsy to an appropriate sampling depth was obtained by Shave (Dermablade or 15 blade) The resulting wound was covered with surgical ointment and bandaged appropriately.     The patient tolerated the procedure well without complications and was without signs of functional compromise.      Specimen has been sent for review by Dermatopathology.    Standard post-procedure care has been explained and has been included in written form within the patient's copy of Informed Consent.    INFORMED CONSENT DISCUSSION AND POST-OPERATIVE INSTRUCTIONS FOR PATIENT    I.  RATIONALE FOR PROCEDURE  I understand that a skin biopsy allows the Dermatologist to test a lesion or rash under the microscope to obtain a diagnosis.  It usually involves numbing the area with numbing medication and removing a small piece of skin; sometimes the area will be closed with sutures. In this specific procedure, sutures are not usually needed.  If any sutures are placed, then they are usually need to be removed in 2 weeks or less.    I understand that my Dermatologist recommends that a skin \"shave\" biopsy be performed today.  A local anesthetic, similar to the kind that a dentist uses when filling a cavity, will be injected with a very small needle into the skin area to be sampled.  The injected skin and tissue underneath \"will go to sleep” and become numb so no pain should be felt afterwards.  An instrument shaped like a tiny \"razor blade\" (shave biopsy instrument) will be used to cut a small piece of tissue and skin from the area so that a sample of tissue can be taken and examined more closely under the microscope.  A slight amount of bleeding will occur, but it will be stopped with direct pressure and a pressure bandage and any other " "appropriate methods.  I understands that a scar will form where the wound was created.  Surgical ointment will be applied to help protect the wound.  Sutures are not usually needed.    II.  RISKS AND POTENTIAL COMPLICATIONS   I understand the risks and potential complications of a skin biopsy include but are not limited to the following:  Bleeding  Infection  Pain  Scar/keloid  Skin discoloration  Incomplete Removal  Recurrence  Nerve Damage/Numbness/Loss of Function  Allergic Reaction to Anesthesia  Biopsies are diagnostic procedures and based on findings additional treatment or evaluation may be required  Loss or destruction of specimen resulting in no additional findings    My Dermatologist has explained to me the nature of the condition, the nature of the procedure, and the benefits to be reasonably expected compared with alternative approaches.  My Dermatologist has discussed the likelihood of major risks or complications of this procedure including the specific risks listed above, such as bleeding, infection, and scarring/keloid.  I understand that a scar is expected after this procedure.  I understand that my physician cannot predict if the scar will form a \"keloid,\" which extends beyond the borders of the wound that is created.  A keloid is a thick, painful, and bumpy scar.  A keloid can be difficult to treat, as it does not always respond well to therapy, which includes injecting cortisone directly into the keloid every few weeks.  While this usually reduces the pain and size of the scar, it does not eliminate it.      I understand that photographs may be taken before and after the procedure.  These will be maintained as part of the medical providers confidential records and may not be made available to me.  I further authorize the medical provider to use the photographs for teaching purposes or to illustrate scientific papers, books, or lectures if in his/her judgment, medical research, education, or " "science may benefit from its use.    I have had an opportunity to fully inquire about the risks and benefits of this procedure and its alternatives.   I have been given ample time and opportunity to ask questions and to seek a second opinion if I wished to do so.  I acknowledge that there have specifically been no guarantees as to the cosmetic results from the procedure.  I am aware that with any procedure there is always the possibility of an unexpected complication.    III. POST-PROCEDURAL CARE (WHAT YOU WILL NEED TO DO \"AFTER THE BIOPSY\" TO OPTIMIZE HEALING)    Keep the area clean and dry.  Try NOT to remove the bandage or get it wet for the first 24 hours.    Gently clean the area and apply surgical ointment (such as Vaseline petrolatum ointment, which is available \"over the counter\" and not a prescription) to the biopsy site for up to 2 weeks straight.  This acts to protect the wound from the outside world.      Sutures are not usually placed in this procedure.  If any sutures were placed, return for suture removal as instructed (generally 1 week for the face, 2 weeks for the body).      Take Acetaminophen (Tylenol) for discomfort, if no contraindications.  Ibuprofen or aspirin could make bleeding worse.    Call our office immediately for signs of infection: fever, chills, increased redness, warmth, tenderness, discomfort/pain, or pus or foul smell coming from the wound.    WHAT TO DO IF THERE IS ANY BLEEDING?  If a small amount of bleeding is noticed, place a clean cloth over the area and apply firm pressure for ten minutes.  Check the wound after 10 minutes of direct pressure.  If bleeding persists, try one more time for an additional 10 minutes of direct pressure on the area.  If the bleeding becomes heavier or does not stop after the second attempt, or if you have any other questions about this procedure, then please call your [facility]atologist by calling [phone] (SKIN).     I hereby acknowledge " that I have reviewed and verified the site with my Dermatologist and have requested and authorized my Dermatologist to proceed with the procedure.               Scribe Attestation      I,:  Brian Prasad am acting as a scribe while in the presence of the attending physician.:       I,:  Wendie Santacruz MD personally performed the services described in this documentation    as scribed in my presence.:            This encounter ([id number] or [id number]) has a MODERATE level of medical decision making (MDM) given the presence of at least 2 of the elements of MDM (below):  *MODERATE number and complexity of problems addressed: 1 or more chronic illnesses with exacerbation, progression, or side effects of treatment; OR 2 or more stable chronic illnesses; OR 1 undiagnosed new problem with uncertain prognosis; OR 1 acute illness with systemic symptoms; OR 1 acute uncomplicated injury  *MODERATE amount and/or complexity of data reviewed: this includes review of previous notes and/or lab tests, independent interpretation of tests performed by another healthcare professional, ordering tests, assessment requiring independent historian, or discussion with other healthcare professionals.  *MODERATE risk of complications and/or morbidity or mortality of patient management (for example, prescription drug management, decisions regarding minor surgery with identified patient or procedure risk factors).

## 2024-09-18 PROCEDURE — 88305 TISSUE EXAM BY PATHOLOGIST: CPT | Performed by: STUDENT IN AN ORGANIZED HEALTH CARE EDUCATION/TRAINING PROGRAM

## 2024-09-18 PROCEDURE — 88341 IMHCHEM/IMCYTCHM EA ADD ANTB: CPT | Performed by: STUDENT IN AN ORGANIZED HEALTH CARE EDUCATION/TRAINING PROGRAM

## 2024-09-18 PROCEDURE — 88342 IMHCHEM/IMCYTCHM 1ST ANTB: CPT | Performed by: STUDENT IN AN ORGANIZED HEALTH CARE EDUCATION/TRAINING PROGRAM

## 2024-09-19 ENCOUNTER — DOCUMENTATION (OUTPATIENT)
Dept: HEMATOLOGY ONCOLOGY | Facility: CLINIC | Age: 54
End: 2024-09-19

## 2024-09-19 ENCOUNTER — TELEPHONE (OUTPATIENT)
Dept: DERMATOLOGY | Facility: CLINIC | Age: 54
End: 2024-09-19

## 2024-09-19 NOTE — TELEPHONE ENCOUNTER
Received call from patient stating he is trying to reach MOHS department.    The call was warn transferred to Ambika to further assist him with uli

## 2024-09-19 NOTE — RESULT ENCOUNTER NOTE
Spoke with patient about results. Patient prefers dermatologic surgeon to perform surgery of MIS. Dr. FELDMAN is going to contact Dr. Marquez to assist in obtaining surgical appointment for this patient. Plan is regular surgical excision with 0.5 mm margins (not slow Mohs).

## 2024-09-19 NOTE — PROGRESS NOTES
In-basket message received from   to add patient to the cutaneous [facility] on 9/20/2024. Chart reviewed and prep completed.

## 2024-09-19 NOTE — TELEPHONE ENCOUNTER
GREGORIA on  for return call regarding scheduling MIS with Dr. Marquez for excision. Looking to schedule in the afternoon on 10/1. Please forward to James or myself for scheduling when call returned if we are available. Thank you

## 2024-09-23 NOTE — TELEPHONE ENCOUNTER
Patient called very grateful for everything we do , he decided to have MOHS procedure in [place] and would like to cancel his appointment on 09/24

## 2025-06-19 ENCOUNTER — APPOINTMENT (EMERGENCY)
Dept: RADIOLOGY | Facility: HOSPITAL | Age: 55
End: 2025-06-19
Payer: COMMERCIAL

## 2025-06-19 ENCOUNTER — HOSPITAL ENCOUNTER (EMERGENCY)
Facility: HOSPITAL | Age: 55
Discharge: HOME | End: 2025-06-19
Attending: EMERGENCY MEDICINE | Admitting: EMERGENCY MEDICINE
Payer: COMMERCIAL

## 2025-06-19 VITALS
HEART RATE: 62 BPM | SYSTOLIC BLOOD PRESSURE: 141 MMHG | TEMPERATURE: 97.8 F | RESPIRATION RATE: 18 BRPM | DIASTOLIC BLOOD PRESSURE: 80 MMHG | OXYGEN SATURATION: 98 %

## 2025-06-19 DIAGNOSIS — R07.9 CHEST PAIN: ICD-10-CM

## 2025-06-19 DIAGNOSIS — M77.9 TENDONITIS: ICD-10-CM

## 2025-06-19 DIAGNOSIS — M79.603: Primary | ICD-10-CM

## 2025-06-19 LAB
ALBUMIN SERPL BCG-MCNC: 5 G/DL (ref 3.5–5)
ALP SERPL-CCNC: 47 U/L (ref 34–104)
ALT SERPL W P-5'-P-CCNC: 19 U/L (ref 7–52)
ANION GAP SERPL CALCULATED.3IONS-SCNC: 6 MMOL/L (ref 4–13)
AST SERPL W P-5'-P-CCNC: 16 U/L (ref 13–39)
BASOPHILS # BLD AUTO: 0.03 THOUSANDS/ÂΜL (ref 0–0.1)
BASOPHILS NFR BLD AUTO: 0 % (ref 0–1)
BILIRUB SERPL-MCNC: 0.48 MG/DL (ref 0.2–1)
BUN SERPL-MCNC: 21 MG/DL (ref 5–25)
CALCIUM SERPL-MCNC: 10 MG/DL (ref 8.4–10.2)
CARDIAC TROPONIN I PNL SERPL HS: <2 NG/L (ref ?–50)
CHLORIDE SERPL-SCNC: 102 MMOL/L (ref 96–108)
CO2 SERPL-SCNC: 29 MMOL/L (ref 21–32)
CREAT SERPL-MCNC: 0.8 MG/DL (ref 0.6–1.3)
EOSINOPHIL # BLD AUTO: 0.1 THOUSAND/ÂΜL (ref 0–0.61)
EOSINOPHIL NFR BLD AUTO: 1 % (ref 0–6)
ERYTHROCYTE [DISTWIDTH] IN BLOOD BY AUTOMATED COUNT: 13.1 % (ref 11.6–15.1)
GFR SERPL CREATININE-BSD FRML MDRD: 101 ML/MIN/1.73SQ M
GLUCOSE SERPL-MCNC: 93 MG/DL (ref 65–140)
HCT VFR BLD AUTO: 43.7 % (ref 36.5–49.3)
HGB BLD-MCNC: 14.6 G/DL (ref 12–17)
IMM GRANULOCYTES # BLD AUTO: 0.03 THOUSAND/UL (ref 0–0.2)
IMM GRANULOCYTES NFR BLD AUTO: 0 % (ref 0–2)
LYMPHOCYTES # BLD AUTO: 2.72 THOUSANDS/ÂΜL (ref 0.6–4.47)
LYMPHOCYTES NFR BLD AUTO: 36 % (ref 14–44)
MCH RBC QN AUTO: 26.6 PG (ref 26.8–34.3)
MCHC RBC AUTO-ENTMCNC: 33.4 G/DL (ref 31.4–37.4)
MCV RBC AUTO: 80 FL (ref 82–98)
MONOCYTES # BLD AUTO: 0.64 THOUSAND/ÂΜL (ref 0.17–1.22)
MONOCYTES NFR BLD AUTO: 8 % (ref 4–12)
NEUTROPHILS # BLD AUTO: 4.12 THOUSANDS/ÂΜL (ref 1.85–7.62)
NEUTS SEG NFR BLD AUTO: 55 % (ref 43–75)
NRBC BLD AUTO-RTO: 0 /100 WBCS
PLATELET # BLD AUTO: 212 THOUSANDS/UL (ref 149–390)
PMV BLD AUTO: 10.4 FL (ref 8.9–12.7)
POTASSIUM SERPL-SCNC: 4.8 MMOL/L (ref 3.5–5.3)
PROT SERPL-MCNC: 7.8 G/DL (ref 6.4–8.4)
RBC # BLD AUTO: 5.48 MILLION/UL (ref 3.88–5.62)
SODIUM SERPL-SCNC: 137 MMOL/L (ref 135–147)
WBC # BLD AUTO: 7.64 THOUSAND/UL (ref 4.31–10.16)

## 2025-06-19 PROCEDURE — 71046 X-RAY EXAM CHEST 2 VIEWS: CPT

## 2025-06-19 PROCEDURE — 36415 COLL VENOUS BLD VENIPUNCTURE: CPT

## 2025-06-19 PROCEDURE — 99285 EMERGENCY DEPT VISIT HI MDM: CPT | Performed by: EMERGENCY MEDICINE

## 2025-06-19 PROCEDURE — 85025 COMPLETE CBC W/AUTO DIFF WBC: CPT

## 2025-06-19 PROCEDURE — 73030 X-RAY EXAM OF SHOULDER: CPT

## 2025-06-19 PROCEDURE — 80053 COMPREHEN METABOLIC PANEL: CPT

## 2025-06-19 PROCEDURE — 93005 ELECTROCARDIOGRAM TRACING: CPT

## 2025-06-19 PROCEDURE — 84484 ASSAY OF TROPONIN QUANT: CPT

## 2025-06-19 PROCEDURE — 72040 X-RAY EXAM NECK SPINE 2-3 VW: CPT

## 2025-06-19 PROCEDURE — 99284 EMERGENCY DEPT VISIT MOD MDM: CPT

## 2025-06-19 PROCEDURE — 96374 THER/PROPH/DIAG INJ IV PUSH: CPT

## 2025-06-19 RX ORDER — KETOROLAC TROMETHAMINE 30 MG/ML
15 INJECTION, SOLUTION INTRAMUSCULAR; INTRAVENOUS ONCE
Status: COMPLETED | OUTPATIENT
Start: 2025-06-19 | End: 2025-06-19

## 2025-06-19 RX ADMIN — KETOROLAC TROMETHAMINE 15 MG: 30 INJECTION, SOLUTION INTRAMUSCULAR; INTRAVENOUS at 16:25

## 2025-06-19 NOTE — ED PROVIDER NOTES
Time reflects when diagnosis was documented in both MDM as applicable and the Disposition within this note       Time User Action Codes Description Comment    6/19/2025  4:59 PM GregoryBrian saavedra Foster [M79.603] Musculoskeletal arm pain     6/19/2025  5:00 PM KennethmadiBrian barrientos Add [R07.9] Chest pain     6/19/2025  5:00 PM GregoryBrian saavedra Foster [M77.9] Tendonitis           ED Disposition       ED Disposition   Discharge    Condition   Stable    Date/Time   Thu Jun 19, 2025  4:59 PM    Comment   Arelis Mayes discharge to home/self care.                   Assessment & Plan       Medical Decision Making  54 year old male, presenting to the Emergency Department for evaluation of left chest pain ongoing intermittently for the past 2 days, with a pins and needles sensation to left index finger and occasionally left palm.    Differential diagnoses included but not limited to: ACS/MI, musculoskeletal injury, musculoskeletal strain, shoulder fracture, electrolyte derangement, dehydration, anxiety, doubt cervical disc herniation, doubt central process.     Orders written for: labs, EKG, and CXR.     Patient initially treated with Toradol and Voltaren gel.     See ED course for full details.     Diagnostic results reviewed and discussed with the patient, who verbalized understanding and was given the opportunity to ask questions.   With a negative troponin, HEART score of 2, reassuring EKG, discussed with patient that ACS has been ruled out at this time.     Discussed with patient that symptoms likely secondary to tendonitis of left shoulder at this time. Encouraged RICE and NSAID use at home.   On re-evaluation, the patient appears well, is in no acute distress and is comfortable with discharge.  The patient reports improvement in his pain, and offers no acute complaints.  I advised the patient on the emergent signs and symptoms for which he should return to the ED, and encouraged continued follow up with PCP within 1 week for  re-evaluation of symptoms.   Patient was referred to Orthopedist to continue follow up for suspected tendonitis.  Patient verbalized understanding the plan of care and was given the opportunity to ask questions.     Amount and/or Complexity of Data Reviewed  Labs: ordered. Decision-making details documented in ED Course.  Radiology: ordered and independent interpretation performed. Decision-making details documented in ED Course.    Risk  Prescription drug management.        ED Course as of 06/19/25 1716   Thu Jun 19, 2025   1649 CBC and differential(!)  Reassuring. No leukocytosis, thrombocytopenia, anemia.    1649 Comprehensive metabolic panel  Reassuring. No electrolyte derangement, SHOLA.    1649 hs TnI 0hr: <2   1712 XR cervical spine 2 or 3 views  No acute fracture or dislocation, as interpreted by me.      1713 XR shoulder 2+ views LEFT  No acute fracture or dislocation as interpreted by me.      1713 XR chest 2 views  No acute cardiopulmonary disease, as interpreted by me.          Medications   Diclofenac Sodium (VOLTAREN) 1 % topical gel 2 g (has no administration in time range)   ketorolac (TORADOL) injection 15 mg (15 mg Intravenous Given 6/19/25 1625)       ED Risk Strat Scores   HEART Risk Score      Flowsheet Row Most Recent Value   Heart Score Risk Calculator    History 0 Filed at: 06/19/2025 1716   ECG 0 Filed at: 06/19/2025 1716   Age 1 Filed at: 06/19/2025 1716   Risk Factors 1 Filed at: 06/19/2025 1716   Troponin 0 Filed at: 06/19/2025 1716   HEART Score 2 Filed at: 06/19/2025 1716          HEART Risk Score      Flowsheet Row Most Recent Value   Heart Score Risk Calculator    History 0 Filed at: 06/19/2025 1716   ECG 0 Filed at: 06/19/2025 1716   Age 1 Filed at: 06/19/2025 1716   Risk Factors 1 Filed at: 06/19/2025 1716   Troponin 0 Filed at: 06/19/2025 1716   HEART Score 2 Filed at: 06/19/2025 1716                      No data recorded                            History of Present Illness        Chief Complaint   Patient presents with    Numbness     Left arm numbness with some left shoulder pain. Pt is unsure if he hurt his left should causing it or if something else is causing it.        Past Medical History[1]   Past Surgical History[2]   Family History[3]   Social History[4]   E-Cigarette/Vaping      E-Cigarette/Vaping Substances      I have reviewed and agree with the history as documented.     HPI  Patient is a 54 year old male, with a history of hypercholesterolemia, who presents to the Emergency Department for evaluation of left shoulder pain and left hand numbness, ongoing for the past 2 days. Patient describes a pins and needles sensation to his left index finger, and states that the sensation will occasionally affect his entire palm. He additionally reports some pain to his left proximal forearm and left anterior chest/shoulder, which has been intermittent in nature. The left chest pain is non-radiating, described as a dull ache, which worsens when pressing on the area. Patient states that he exercises approximately 3 days a week and states that 3 days ago he was working out with a  and feels as though this may be contributing to his symptoms. The patient denies any fevers, chills, cough, difficulty breathing, abdominal pain, trauma to the affected areas, neck pain, history of disc herniations.     Review of Systems   All other systems reviewed and are negative.          Objective       ED Triage Vitals   Temperature Pulse Blood Pressure Respirations SpO2 Patient Position - Orthostatic VS   06/19/25 1441 06/19/25 1441 06/19/25 1441 06/19/25 1441 06/19/25 1441 --   97.8 °F (36.6 °C) 62 141/80 18 98 %       Temp Source Heart Rate Source BP Location FiO2 (%) Pain Score    06/19/25 1441 06/19/25 1441 06/19/25 1441 -- 06/19/25 1625    Oral Monitor Right arm  2      Vitals      Date and Time Temp Pulse SpO2 Resp BP Pain Score FACES Pain Rating User   06/19/25 1625 -- -- -- -- -- 2 -- LR    06/19/25 1441 97.8 °F (36.6 °C) 62 98 % 18 141/80 -- -- SG            Physical Exam  Vitals reviewed.   Constitutional:       General: He is not in acute distress.     Appearance: Normal appearance. He is not ill-appearing, toxic-appearing or diaphoretic.      Comments: Patient is mildly anxious.      Cardiovascular:      Rate and Rhythm: Normal rate and regular rhythm.      Pulses: Normal pulses.      Heart sounds: Normal heart sounds. No murmur heard.  Pulmonary:      Breath sounds: Normal breath sounds. No wheezing, rhonchi or rales.   Abdominal:      Palpations: Abdomen is soft.      Tenderness: There is no abdominal tenderness.     Musculoskeletal:         General: Normal range of motion.      Cervical back: Neck supple. No tenderness.      Comments: Tenderness to palpation to anterior left shoulder and posterior left shoulder overlying medial trapezius musculature.      Skin:     General: Skin is warm.      Capillary Refill: Capillary refill takes less than 2 seconds.     Neurological:      Mental Status: He is alert and oriented to person, place, and time.      Comments: Strength and sensation intact to bilateral upper extremities.  Patient is able to make a fist with left hand without difficulty.   No distal neurovascular deficits to left upper extremity.   No appreciable weakness to left upper extremity when compared to right upper extremity.    Psychiatric:         Mood and Affect: Mood normal.         Behavior: Behavior normal.         Thought Content: Thought content normal.         Judgment: Judgment normal.         Results Reviewed       Procedure Component Value Units Date/Time    HS Troponin I 2hr [[id number]]     Lab Status: No result Specimen: Blood     HS Troponin I 4hr [[id number]]     Lab Status: No result Specimen: Blood     HS Troponin 0hr (reflex protocol) [[id number]]  (Normal) Collected: 06/19/25 2355    Lab Status: Final result Specimen: Blood from Arm, Left Updated: 06/19/25 1624     hs  TnI 0hr <2 ng/L     Comprehensive metabolic panel [[id number]] Collected: 06/19/25 1555    Lab Status: Final result Specimen: Blood from Arm, Left Updated: 06/19/25 1617     Sodium 137 mmol/L      Potassium 4.8 mmol/L      Chloride 102 mmol/L      CO2 29 mmol/L      ANION GAP 6 mmol/L      BUN 21 mg/dL      Creatinine 0.80 mg/dL      Glucose 93 mg/dL      Calcium 10.0 mg/dL      AST 16 U/L      ALT 19 U/L      Alkaline Phosphatase 47 U/L      Total Protein 7.8 g/dL      Albumin 5.0 g/dL      Total Bilirubin 0.48 mg/dL      eGFR 101 ml/min/1.73sq m     Narrative:      National Kid[employer] guidelines for Chronic Kidney Disease (CKD):     Stage 1 with normal or high GFR (GFR > 90 mL/min/1.73 square meters)    Stage 2 Mild CKD (GFR = 60-89 mL/min/1.73 square meters)    Stage 3A Moderate CKD (GFR = 45-59 mL/min/1.73 square meters)    Stage 3B Moderate CKD (GFR = 30-44 mL/min/1.73 square meters)    Stage 4 Severe CKD (GFR = 15-29 mL/min/1.73 square meters)    Stage 5 End Stage CKD (GFR <15 mL/min/1.73 square meters)  Note: GFR calculation is accurate only with a steady state creatinine    CBC and differential [[id number]]  (Abnormal) Collected: 06/19/25 1555    Lab Status: Final result Specimen: Blood from Arm, Left Updated: 06/19/25 1605     WBC 7.64 Thousand/uL      RBC 5.48 Million/uL      Hemoglobin 14.6 g/dL      Hematocrit 43.7 %      MCV 80 fL      MCH 26.6 pg      MCHC 33.4 g/dL      RDW 13.1 %      MPV 10.4 fL      Platelets 212 Thousands/uL      nRBC 0 /100 WBCs      Segmented % 55 %      Immature Grans % 0 %      Lymphocytes % 36 %      Monocytes % 8 %      Eosinophils Relative 1 %      Basophils Relative 0 %      Absolute Neutrophils 4.12 Thousands/µL      Absolute Immature Grans 0.03 Thousand/uL      Absolute Lymphocytes 2.72 Thousands/µL      Absolute Monocytes 0.64 Thousand/µL      Eosinophils Absolute 0.10 Thousand/µL      Basophils Absolute 0.03 Thousands/µL             XR cervical spine 2 or  3 views   ED Interpretation by Brian ANDRE DO (06/19 1712)   No acute fracture or dislocation, as interpreted by me.       XR chest 2 views   ED Interpretation by Brian ANDRE DO (06/19 1713)   No acute cardiopulmonary disease, as interpreted by me.       XR shoulder 2+ views LEFT   ED Interpretation by Brian ANDRE DO (06/19 1713)   No acute fracture or dislocation as interpreted by me.           ECG 12 Lead Documentation Only    Date/Time: 6/19/2025 4:50 PM    Performed by: Brian ANDRE DO  Authorized by: Brian ANDRE DO    Indications / Diagnosis:  Chest pain  ECG reviewed by me, the ED Provider: yes    Patient location:  ED  Previous ECG:     Previous ECG:  Compared to current    Similarity:  Changes noted (Ventricular rate has increased)    Comparison to cardiac monitor: No    Interpretation:     Interpretation: non-specific    Rate:     ECG rate:  109 bpm    ECG rate assessment: normal    Rhythm:     Rhythm: sinus tachycardia        ED Medication and Procedure Management   Prior to Admission Medications   Prescriptions Last Dose Informant Patient Reported? Taking?   Cholecalciferol (Vitamin D3) 50 MCG (2000 UT) capsule   No No   Sig: Take 1 capsule (2,000 Units total) by mouth daily   atorvastatin (LIPITOR) 10 mg tablet   Yes No   Sig: Take 10 mg by mouth daily      Facility-Administered Medications: None     Discharge Medication List as of 6/19/2025  5:00 PM        CONTINUE these medications which have NOT CHANGED    Details   atorvastatin (LIPITOR) 10 mg tablet Take 10 mg by mouth daily, Starting Sat 3/26/2016, Historical Med      Cholecalciferol (Vitamin D3) 50 MCG (2000 UT) capsule Take 1 capsule (2,000 Units total) by mouth daily, Starting Thu 9/5/2024, No Print             ED SEPSIS DOCUMENTATION   Time reflects when diagnosis was documented in both MDM as applicable and the Disposition within this note       Time User Action Codes Description Comment    6/19/2025  4:59 PM  Brian Cameron Add [M79.603] Musculoskeletal arm pain     6/19/2025  5:00 PM Brian Cameron Add [R07.9] Chest pain     6/19/2025  5:00 PM Brian Cameron Add [M77.9] Tendonitis                    [1]   Past Medical History:  Diagnosis Date    Cholecystitis     Obstructive sleep apnea    [2]   Past Surgical History:  Procedure Laterality Date    CHOLECYSTECTOMY LAPAROSCOPIC  2010    COLONOSCOPY  2022    normal    UVULOPALATOPLASTY  2015   [3]   Family History  Problem Relation Name Age of Onset    Cancer Mother      Dementia Mother      Coronary artery disease Father      Hypertension Brother      Cancer Paternal Grandfather     [4]   Social History  Tobacco Use    Smoking status: Never    Smokeless tobacco: Never   Substance Use Topics    Drug use: Never        Brian ANDRE DO  06/19/25 171

## 2025-06-19 NOTE — ED ATTENDING ATTESTATION
6/19/2025  ITrish MD, saw and evaluated the patient. I have discussed the patient with the resident/non-physician practitioner and agree with the resident's/non-physician practitioner's findings, Plan of Care, and MDM as documented in the resident's/non-physician practitioner's note, except where noted. All available labs and Radiology studies were reviewed.  I was present for key portions of any procedure(s) performed by the resident/non-physician practitioner and I was immediately available to provide assistance.       At this point I agree with the current assessment done in the Emergency Department.  I have conducted an independent evaluation of this patient a history and physical is as follows:    54-year-old male with a history of hyperlipidemia presenting for evaluation of left shoulder pain and numbness.  Patient states his symptoms started 2 days ago.  Notes pain in his left anterior shoulder that is worse with movement and on palpation.  States he has intermittent numbness going down his left upper extremity with numbness in his left index finger.  Denies trauma or injury to the area.  States he has been working out more recently and may have overdone it; patient is left-handed.  However, patient states last night he had some jaw pain and he is concerned that his pain may be cardiac in nature.  Patient denies associated headache, neck pain, back pain, shortness of breath, nausea, vomiting, diaphoresis, abdominal pain, leg swelling, history of VTE.  Denies headache, vision changes, focal weakness.  He also notes pain in the top of the shoulder and along his trapezius.  Did not take anything for his symptoms at home.  Patient is most concerned about a cardiac etiology of his symptoms.  Reviewed his previous stress echo from last September.  Patient states he had a coronary CT score of 0 5 years ago.    Please see resident documentation for histories and review of systems.    Exam:  Vital signs and nursing notes reviewed  General: Awake, alert, anxious, no acute distress  HEENT: Normocephalic, atraumatic, mucous membranes moist  Neck: No midline cervical tenderness to palpation, no scapular paraspinal tenderness  Heart: Regular rate and rhythm, no murmur  Lungs: Clear to auscultation bilaterally without wheezes, rales, or rhonchi.  No chest wall tenderness or crepitus  Abdomen: Soft, nontender, nondistended, no rebound or guarding  Extremities: No swelling or deformity.  Tenderness to palpation of the left anterior shoulder over the supraspinatus tendon insertion point.  Mild tenderness to the trapezius on the left.  No bony tenderness to left upper extremity.  Good distal pulses and capillary refill in the left upper extremity.  Sensation is intact and equal in the bilateral upper extremities.  Strength is intact 5/5 bilateral upper extremities with flexion and extension at the shoulder, elbow, wrist, intrinsic hand muscles.  Sensation intact and equal in the face.  Negative Phalen's and reverse Phalen's test  Skin: Warm, dry, intact, no rash  Neuro: Cranial nerves III, IV, V, VI, VII, XI, XII intact.  No dysmetria on finger-to-nose.  Intact strength and sensation as above.  No pronator or leg drift.  No dysarthria or aphasia    EKG: Reviewed by myself and the resident physician: Sinus tachycardia without evidence of acute ischemia or malignant dysrhythmia    ED Course  ED Course as of 25 0031   u 2025   1614 CBC and differential(!)  Grossly normal   1629 Comprehensive metabolic panel  Grossly normal   1629 hs TnI 0hr: <2   1655 X ray cervical spine per my interpretation: no acute osseous abnormality  CXR per my interpretation: no acute cardiopulmonary abnormality  X ray shoulder per my interpretation: no acute osseous abnormality     ED course/Medical Decision Makin-year-old male presenting for evaluation of left shoulder pain and arm numbness.  Differential diagnosis  includes acute fracture, dislocation, ligamentous injury, sprain, tendinitis, AC joint separation, muscle spasm, brachial plexus injury or strain, cervical radiculopathy, stroke, acute coronary syndrome, pneumonia, pneumothorax, pulmonary edema, pleural effusion, anemia, musculoskeletal injury.  EKG shows sinus tachycardia without evidence of acute ischemia or malignant dysrhythmia; initial troponin is less than 2.  Heart score is 2; patient is at low risk for major acute cardiac event in the next 30 days.  Patient's heart rate improved rapidly without intervention; patient was anxious initially on arrival when EKG is performed.  No risk factors for VTE with low suspicion for pulmonary embolism.  Do not feel he requires further workup for this at this time.  CBC and CMP are grossly normal.  Chest x-ray per my interpretation is negative for acute cardiopulmonary abnormality.  X-ray of the cervical spine per my interpretation is negative for acute osseous abnormality, and x-ray of the left shoulder per my interpretation is negative for acute osseous abnormality.  No findings to suggest carpal tunnel or ulnar tunnel on my exam.  No neck pain or tenderness with low suspicion for cervical radiculopathy.  Low suspicion for spinal cord compression given intact upper extremity neurological examination.  Low suspicion for stroke; patient's neurological examination is intact.  Do not feel he requires neuroimaging today.  Suspect possible tendinitis contributing to his symptoms given point tenderness in the anterior left shoulder.  Pain was treated supportively with improvement in symptoms.  Will refer patient to orthopedic surgery for further evaluation.  At this time, he is appropriate for discharge home with close outpatient PCP follow-up.  Return precautions for worsening pain, paresthesias, focal weakness, chest pain, shortness of breath, or new symptoms discussed.  Patient is in agreement and understanding of these  instructions.    Diagnosis: Left supraspinatus tendinitis, chest pain, unspecified; musculoskeletal left shoulder pain  Disposition: Discharge

## 2025-06-20 LAB
ATRIAL RATE: 109 BPM
P AXIS: 76 DEGREES
PR INTERVAL: 158 MS
QRS AXIS: 15 DEGREES
QRSD INTERVAL: 98 MS
QT INTERVAL: 368 MS
QTC INTERVAL: 495 MS
T WAVE AXIS: 59 DEGREES
VENTRICULAR RATE: 109 BPM

## 2025-06-20 PROCEDURE — 93010 ELECTROCARDIOGRAM REPORT: CPT | Performed by: INTERNAL MEDICINE

## 2025-06-23 ENCOUNTER — OFFICE VISIT (OUTPATIENT)
Dept: OBGYN CLINIC | Facility: OTHER | Age: 55
End: 2025-06-23
Payer: COMMERCIAL

## 2025-06-23 ENCOUNTER — APPOINTMENT (OUTPATIENT)
Dept: LAB | Facility: CLINIC | Age: 55
End: 2025-06-23
Payer: COMMERCIAL

## 2025-06-23 ENCOUNTER — OFFICE VISIT (OUTPATIENT)
Age: 55
End: 2025-06-23
Payer: COMMERCIAL

## 2025-06-23 VITALS
DIASTOLIC BLOOD PRESSURE: 78 MMHG | HEIGHT: 70 IN | OXYGEN SATURATION: 98 % | WEIGHT: 208 LBS | BODY MASS INDEX: 29.78 KG/M2 | SYSTOLIC BLOOD PRESSURE: 130 MMHG | HEART RATE: 59 BPM | RESPIRATION RATE: 18 BRPM

## 2025-06-23 VITALS — BODY MASS INDEX: 29.78 KG/M2 | WEIGHT: 208 LBS | HEIGHT: 70 IN

## 2025-06-23 DIAGNOSIS — R73.03 PREDIABETES: ICD-10-CM

## 2025-06-23 DIAGNOSIS — R07.89 ATYPICAL CHEST PAIN: Primary | ICD-10-CM

## 2025-06-23 DIAGNOSIS — M54.12 RADICULOPATHY, CERVICAL REGION: ICD-10-CM

## 2025-06-23 DIAGNOSIS — G62.9 SENSORY NEUROPATHY: ICD-10-CM

## 2025-06-23 DIAGNOSIS — E78.00 PURE HYPERCHOLESTEROLEMIA: ICD-10-CM

## 2025-06-23 DIAGNOSIS — E55.9 VITAMIN D DEFICIENCY: ICD-10-CM

## 2025-06-23 DIAGNOSIS — M77.8 TENDINITIS OF LEFT SHOULDER: ICD-10-CM

## 2025-06-23 DIAGNOSIS — M47.812 CERVICAL SPONDYLOSIS: ICD-10-CM

## 2025-06-23 DIAGNOSIS — E78.00 PURE HYPERCHOLESTEROLEMIA: Primary | ICD-10-CM

## 2025-06-23 LAB
25(OH)D3 SERPL-MCNC: 24.3 NG/ML (ref 30–100)
ALBUMIN SERPL BCG-MCNC: 4.7 G/DL (ref 3.5–5)
ALP SERPL-CCNC: 48 U/L (ref 34–104)
ALT SERPL W P-5'-P-CCNC: 18 U/L (ref 7–52)
ANION GAP SERPL CALCULATED.3IONS-SCNC: 6 MMOL/L (ref 4–13)
AST SERPL W P-5'-P-CCNC: 19 U/L (ref 13–39)
B BURGDOR IGG+IGM SER QL IA: NEGATIVE
BILIRUB SERPL-MCNC: 0.88 MG/DL (ref 0.2–1)
BUN SERPL-MCNC: 18 MG/DL (ref 5–25)
CALCIUM SERPL-MCNC: 9.5 MG/DL (ref 8.4–10.2)
CHLORIDE SERPL-SCNC: 101 MMOL/L (ref 96–108)
CHOLEST SERPL-MCNC: 192 MG/DL (ref ?–200)
CO2 SERPL-SCNC: 30 MMOL/L (ref 21–32)
CREAT SERPL-MCNC: 0.84 MG/DL (ref 0.6–1.3)
EST. AVERAGE GLUCOSE BLD GHB EST-MCNC: 117 MG/DL
FOLATE SERPL-MCNC: 13.1 NG/ML
GFR SERPL CREATININE-BSD FRML MDRD: 99 ML/MIN/1.73SQ M
GLUCOSE P FAST SERPL-MCNC: 108 MG/DL (ref 65–99)
HBA1C MFR BLD: 5.7 %
HDLC SERPL-MCNC: 37 MG/DL
LDLC SERPL CALC-MCNC: 125 MG/DL (ref 0–100)
NONHDLC SERPL-MCNC: 155 MG/DL
POTASSIUM SERPL-SCNC: 5 MMOL/L (ref 3.5–5.3)
PROT SERPL-MCNC: 7.4 G/DL (ref 6.4–8.4)
SODIUM SERPL-SCNC: 137 MMOL/L (ref 135–147)
TRIGL SERPL-MCNC: 150 MG/DL (ref ?–150)
TSH SERPL DL<=0.05 MIU/L-ACNC: 2.28 UIU/ML (ref 0.45–4.5)
VIT B12 SERPL-MCNC: 159 PG/ML (ref 180–914)

## 2025-06-23 PROCEDURE — 82746 ASSAY OF FOLIC ACID SERUM: CPT | Performed by: INTERNAL MEDICINE

## 2025-06-23 PROCEDURE — 80053 COMPREHEN METABOLIC PANEL: CPT

## 2025-06-23 PROCEDURE — 84443 ASSAY THYROID STIM HORMONE: CPT

## 2025-06-23 PROCEDURE — 36415 COLL VENOUS BLD VENIPUNCTURE: CPT

## 2025-06-23 PROCEDURE — 83036 HEMOGLOBIN GLYCOSYLATED A1C: CPT

## 2025-06-23 PROCEDURE — 82607 VITAMIN B-12: CPT | Performed by: INTERNAL MEDICINE

## 2025-06-23 PROCEDURE — 86618 LYME DISEASE ANTIBODY: CPT

## 2025-06-23 PROCEDURE — 82306 VITAMIN D 25 HYDROXY: CPT

## 2025-06-23 PROCEDURE — 99204 OFFICE O/P NEW MOD 45 MIN: CPT | Performed by: ORTHOPAEDIC SURGERY

## 2025-06-23 PROCEDURE — 80061 LIPID PANEL: CPT

## 2025-06-23 PROCEDURE — 99214 OFFICE O/P EST MOD 30 MIN: CPT | Performed by: INTERNAL MEDICINE

## 2025-06-23 NOTE — PROGRESS NOTES
INTERNAL MEDICINE OFFICE VISIT       NAME: Arelis Mayes  AGE: 54 y.o. SEX: male       : 1970        MRN: [id number]    DATE: 2025  TIME: 10:52 AM    Assessment and Plan   1. Atypical chest pain (Primary)   Emergency Department visit included electrocardiogram, lab testing for acute coronary syndrome, which were benign.  On history and exam today, his lateral left pectoral symptoms appear to be related to his left shoulder injury and seems to be muscle recruitment.    In addition, as part of his executive physical, there was a 2024 stress echocardiogram which was normal.    2. Sensory neuropathy  There is a feeling of burning of the tip of the left index finger.  This may be radicular nerve impingement at the level of the shoulder or possibly the cervical spine.  Less likely would be CNS cause.  In addition, he is having episodes of pinprick sensations in his extremities.  Arelis is very nervous about his symptoms.  I did reassure him that his history and physical exam are not indicative of any degenerative central nervous system process such as ALS or MS, which he is very worried about.  Possibility of a metabolic cause, or hyperventilation induced symptoms due to anxiety was discussed.  Plan is a full workup and I will contact him with results.  - TSH, 3rd generation with Free T4 reflex; Future  - Vitamin B12  - Folate  - MRI cervical spine wo contrast; Future  - MRI brain wo contrast; Future  - Lyme Total AB W Reflex to IGM/IGG; Future    3. Cervical spondylosis  Cervical spine x-rays of , performed emergency room did show evidence of minimal cervical spondylosis at C7, normal for age.  Given isolated burning sensation of left index fingertip, and location of spondylosis, there is a possibility of nerve compression and MRI was ordered.  - MRI cervical spine wo contrast; Future    4. Tendinitis of left shoulder  Arelis is utilizing a  is doing a lot of  stretching with him, including manipulation of joints and he may have sprained his shoulder.  On exam today, maneuvers for rotator cuff did not yield any pathology.  There seems to be some slight shoulder swelling of the glenohumeral joint. Arelis will be seeing Dr Farfan today.        Chief Complaint   Emergency department visit follow-up    History of Present Illness   Arelis Mayes is a 54 y.o.-year-old male who contacted yesterday regarding his emergency department visit on June 19 and some ongoing symptoms.    Please see notes above regarding details.    Review of Systems   Review of Systems   Constitutional: Negative.    HENT:          Slight discomfort in the anterior cervical triangle when swallowing.   Respiratory: Negative.     Cardiovascular:  Positive for chest pain and palpitations.   Gastrointestinal: Negative.    Neurological: Negative.    Psychiatric/Behavioral:  The patient is nervous/anxious.        Active Problem List   Problem List[1]    The following portions of the patient's history were reviewed and updated as appropriate: allergies, current medications, past family history, past medical history, past social history, past surgical history, and problem list.    Objective     Vitals:    06/23/25 0936   BP: 130/78   Pulse: 59   Resp: 18   SpO2: 98%     Wt Readings from Last 3 Encounters:   06/23/25 94.3 kg (208 lb)   09/11/24 89.5 kg (197 lb 4.8 oz)   09/05/24 89.8 kg (197 lb 15.6 oz)       Physical Exam    VSS, afebrile, pulse regular    Alert and Oriented in NAD    HEENT: NCAT, Pupils equal, 3 mm, EOEMI, no icterus or pallor, no iritis or conjunctivitis. No head or neck mass or adenopathy.     Ears:  normal-appearing external auditory canals and tympanic membranes,     Nose: patent nasal passages without polyps or masses, no septal deviation, no nasal deformity,     Oral cavity and throat: normal dentition, no gum disease, normal mucosa, patent airway, no hemorrhages or exudates in the throat.   Exam of salivary glands and ducts are normal. No submandibular or submental adenopathy    Neck: supple, no trauma or tenderness.  No JVD.  Normal carotid upstroke and descent without bruits.  Trachea midline without stridor.  Thyroid exam normal on inspection and palpation.  No spinal tenderness, full range of motion.    Lymphatics: no adenopathy throughout    Chest:  No trauma or deformity, no supraclavicular adenopathy or bruit.  Chest wall expansion is symmetric and normal, expiratory phase is not prolonged.  No chest wall tenderness including over lateral left pectoral region where patient is having some discomfort.    Heart:  Regular rate and rhythm, normal S1-S2, no S4-S3, no clicks murmurs or rubs.    Lungs:  Clear to auscultation and normal to percussion.    Back:  No trauma or deformity, no CVA mass or CVA tenderness.    Skin:  No rash or skin malignancy.    Abdomen:  Nondistended, normal bowel sounds, soft nontender without masses bruits organomegaly.  There is no hernia.  There are no surgical scars.    Extremities:  Arterial circulation is symmetrically normal with no clubbing cyanosis or edema.  There are no varicosities, there is no calf tenderness or phlebitic findings.    Joints: There is slight swelling of the left glenohumeral joint.  There is full range of motion with minimal pain.  Rotator cuff maneuvers were negative.  AC joint normal on inspection and palpation.    Affect:  Normal    Neurologic:  Normal and stable gait, and otherwise no focal findings as well.  This includes no focal weakness, no sensory deficit, no spasticity, no incoordination.    Cognitive:  Intact.       Pertinent Laboratory/Diagnostic Studies:  I have reviewed pertinent labs:  CBC:   Lab Results   Component Value Date    WBC 7.64 06/19/2025    RBC 5.48 06/19/2025    HGB 14.6 06/19/2025    HCT 43.7 06/19/2025    MCV 80 (L) 06/19/2025     06/19/2025    MCH 26.6 (L) 06/19/2025    MCHC 33.4 06/19/2025    RDW 13.1  "06/19/2025    MPV 10.4 06/19/2025    NEUTROABS 4.12 06/19/2025     CMP:   Lab Results   Component Value Date    SODIUM 137 06/19/2025    K 4.8 06/19/2025     06/19/2025    CO2 29 06/19/2025    AGAP 6 06/19/2025    BUN 21 06/19/2025    CREATININE 0.80 06/19/2025    GLUC 93 06/19/2025    GLUF 97 09/05/2024    CALCIUM 10.0 06/19/2025    AST 16 06/19/2025    ALT 19 06/19/2025    ALKPHOS 47 06/19/2025    TP 7.8 06/19/2025    ALB 5.0 06/19/2025    TBILI 0.48 06/19/2025    EGFR 101 06/19/2025     EKG   Lab Results   Component Value Date    VENTRATE 109 06/19/2025    ATRIALRATE 109 06/19/2025    PRINT 158 06/19/2025    QRSDINT 98 06/19/2025    QTINT 368 06/19/2025    QTCINT 495 06/19/2025    PAXIS 76 06/19/2025    QRSAXIS 15 06/19/2025    TWAVEAXIS 59 06/19/2025     Cardiac Profile No results found for: \"CKTOTAL\", \"TROPONINI\", \"POCTROP\", \"CKMB\", \"CKMBINDEX\"  Imaging Studies: XR cervical spine 2 or 3 views  Result Date: 6/20/2025  Narrative: XR SPINE CERVICAL 2 OR 3 VW INJURY INDICATION: neck pain. COMPARISON: None FINDINGS: No acute fracture or subluxation. Anatomic alignment. Minimal degenerative changes of the lower cervical spine most prominent C6-C7. Normal prevertebral soft tissues. Clear lung apices.     Impression: No acute osseous abnormality. Workstation performed: [id number]     XR chest 2 views  Result Date: 6/20/2025  Narrative: XR CHEST PA AND LATERAL INDICATION: chest pain. COMPARISON: Same day cervical spine and left shoulder radiographs FINDINGS: Clear lungs. No pneumothorax or pleural effusion. Normal cardiomediastinal silhouette. Bones are unremarkable for age. Normal upper abdomen.     Impression: No acute cardiopulmonary disease. Workstation performed: [id number]     XR shoulder 2+ views LEFT  Result Date: 6/20/2025  Narrative: XR SHOULDER 2+ VW LEFT INDICATION: pain to left shoulder, possibly overuse. COMPARISON: Same day chest x-ray FINDINGS: No acute fracture or dislocation. No significant " degenerative changes. No lytic or blastic osseous lesion. Unremarkable soft tissues.     Impression: No acute osseous abnormality. Computerized Assisted Algorithm (CAA) may have been used to analyze all applicable images. Workstation performed: [id number]       Orders Placed This Encounter   Procedures    MRI cervical spine wo contrast    MRI brain wo contrast    TSH, 3rd generation with Free T4 reflex    Vitamin B12    Folate    Lyme Total AB W Reflex to IGM/IGG       ALLERGIES:  Allergies[2]    Current Medications   Current Medications[3]      Health Maintenance        Kee Doyle MD         [1]   Patient Active Problem List  Diagnosis    Pure hypercholesterolemia    Vitamin D deficiency    Prediabetes    Carotid stenosis, asymptomatic, bilateral   [2] No Known Allergies  [3]   Current Outpatient Medications   Medication Sig Dispense Refill    atorvastatin (LIPITOR) 10 mg tablet Take 10 mg by mouth in the morning.       No current facility-administered medications for this visit.

## 2025-06-23 NOTE — PROGRESS NOTES
Assessment & Plan  Radiculopathy, cervical region  Discussed with the patient that his symptoms are not consistent with a primary shoulder issue. He does however have symptoms consistent with cervical radiculopathy. X-rays also show degenerative changes of the cervical spine most prominent C6-C7.  I have discussed with the patient the pathophysiology of this diagnosis and reviewed how the examination correlates with this diagnosis.  I recommend the patient start physical therapy and follow up with Dr. Doyle to discuss cervical MRI.  Referral was also placed for follow up with the spine and pain department.     I did give the patient confidence that his left shoulder appears healthy and that any pathology in his shoulder would not likely cause the symptoms he is describing, fortunately given his benign shoulder exam and benign shoulder imaging I feel there is no likelihood that there is underlying shoulder pathology to further evaluate or treat.    Orders:    Ambulatory Referral to Orthopedic Surgery    Ambulatory Referral to Physical Therapy; Future    Ambulatory referral to Spine & Pain Management; Future      Subjective:   Patient ID: Arelis Mayes is a 54 y.o. male      HPI  The patient presents with a chief complaint of left shoulder pain.   The pain began 1 week(s) ago and is not associated with an acute injury. The patient describes the pain as aching, dull, and sharp in intensity,  intermittent in timing, and localizes the pain to the  left subacromial joint, periscapular, left chest wall pain with radicular symptoms to the index finger.  The pain is worse with movement and relieved by rest.  The pain not associated with numbness and tingling into the left index finger.  The pain is not associated with constitutional symptoms. The patient is not awoken at night by the pain.    The patient was seen by his PCP Dr Doyle this morning and referred to see me in orthopedic consultation.   I did review his notes  "and he does appear to believe that the symptoms are cervical in nature with the potential of left shoulder pathology      The following portions of the patient's history were reviewed and updated as appropriate: allergies, current medications, past family history, past medical history, past social history, past surgical history and problem list.        Objective:  Ht 5' 10\" (1.778 m) Comment: verbal  Wt 94.3 kg (208 lb)   BMI 29.84 kg/m²       Left Shoulder Exam     Tenderness   The patient is experiencing no tenderness.     Range of Motion   The patient has normal left shoulder ROM.    Muscle Strength   The patient has normal left shoulder strength.    Tests   Nicole test: negative  Impingement: negative    Other   Erythema: absent  Sensation: normal  Pulse: present             Physical Exam  Vitals reviewed.   Constitutional:       Appearance: He is well-developed.   HENT:      Head: Normocephalic.     Eyes:      Pupils: Pupils are equal, round, and reactive to light.     Pulmonary:      Effort: Pulmonary effort is normal.   Abdominal:      General: Abdomen is flat. There is no distension.     Skin:     General: Skin is warm and dry.           I have personally reviewed pertinent films in PACS and my interpretation is as follows.    Left shoulder x-rays demonstrates no fracture or dislocation.  Cervical x-rays demonstrates multilevel degenerative changes most prominent C6/7     Records Reviewed: Dr. Doyle's office note from 6/23/25 as well as his note from the emergency department visit on June 19, 2025.    Scribe Attestation      I,:  Danisha Church MA am acting as a scribe while in the presence of the attending physician.:       I,:  Jesu Farfan MD personally performed the services described in this documentation    as scribed in my presence.:            "

## 2025-06-24 ENCOUNTER — HOSPITAL ENCOUNTER (OUTPATIENT)
Dept: RADIOLOGY | Age: 55
Discharge: HOME | End: 2025-06-24
Attending: INTERNAL MEDICINE

## 2025-06-24 DIAGNOSIS — G62.9 SENSORY NEUROPATHY: ICD-10-CM

## 2025-06-24 DIAGNOSIS — E78.00 PURE HYPERCHOLESTEROLEMIA: ICD-10-CM

## 2025-06-24 DIAGNOSIS — R73.03 PREDIABETES: ICD-10-CM

## 2025-06-24 DIAGNOSIS — E53.8 VITAMIN B12 DEFICIENCY: Primary | ICD-10-CM

## 2025-06-24 DIAGNOSIS — M47.812 CERVICAL SPONDYLOSIS: ICD-10-CM

## 2025-06-24 DIAGNOSIS — E55.9 VITAMIN D DEFICIENCY: ICD-10-CM

## 2025-06-24 PROCEDURE — 72141 MRI NECK SPINE W/O DYE: CPT

## 2025-06-24 PROCEDURE — 70551 MRI BRAIN STEM W/O DYE: CPT

## 2025-06-24 RX ORDER — LANOLIN ALCOHOL/MO/W.PET/CERES
1000 CREAM (GRAM) TOPICAL DAILY
Qty: 30 TABLET | Refills: 1 | Status: SHIPPED | OUTPATIENT
Start: 2025-06-24

## 2025-06-24 NOTE — PROGRESS NOTES
Phone Call: I spoke to Arelis regarding yesterday's lab work, which showed vitamin B12 deficiency.  We reviewed his diet which is vegan.  We discussed that most likely his vitamin B12 deficiency is due to inadequate vitamin B12 intake rather than absorption problem.    Plan is to start oral vitamin B12 1000 mcg/day, with repeat vitamin D level, CBC and methylmalonic acid level in 1 month and then most likely transition to vitamin B complex.    For concomitant vitamin D deficiency, also dietary nature, we will start vitamin D3 2000 units/day.    For reassessment of prediabetes and hyperlipidemia, recheck hemoglobin A1c and lipid panel 1 month while working on improving diet.      We are discussed visit with orthopedics yesterday ruling out primary left shoulder cause for his neuropathy of the left index finger.  This is most likely due to cervical level compression.  MRI of the brain and cervical spine is scheduled for later today.  I will contact Arelis with the results.  We discussed to anticipate possible compressive neuropathy at the C7 level based on clinical presentation including recent cervical spine x-rays, and referral to [facility].  We discussed that this would be conservative therapy and that surgery would not be necessary.

## 2025-07-08 NOTE — PROGRESS NOTES
"PT Evaluation     Today's date: 2025  Patient name: Arelis Mayes  : 1970  MRN: [id number]  Referring provider: Jesu Farfan*  Dx:   Encounter Diagnosis     ICD-10-CM    1. Radiculopathy, cervical region  M54.12 Ambulatory Referral to Physical Therapy          Start Time: 1600  Stop Time: 1700  Total time in clinic (min): 60 minutes    Assessment  Impairments: abnormal coordination, abnormal muscle firing, abnormal movement, activity intolerance, impaired physical strength, lacks appropriate home exercise program, pain with function, poor posture , poor body mechanics, sensory processing, participation limitations, activity limitations and endurance  Symptom irritability: high    Assessment details: Arelis Mayes is a pleasant 54 y.o. male with a referred dx of cervical radiculopathy from Jesu Farfan MD. After recent Neurology follow-up performed by Pete Madrid MD at [facility], \" there is electrophysiological evidence of subacute left C7 radiculopathy, due to a left paracentral C6/C7 disc protrusion seen on the MRI.\" No further referral appears necessary at this time based upon examination results. Clinical presentation appears to be consistent with MRI results of peripheral neurogenic cause. Upon further clinical testing, patient presents with the following deficits: poor dural mobility of C6-C7 peripheral nerve root, peripheral neurogenic pain mechanism, provocative median neurodynamics testing, decrease thoracic extension, decrease deep neck flexor strength, and high tissue irritability. UE strength were WNL and no pertinent findings of clinical neuro exam. These deficits and impairments result in decrease quality of life, decrease functional capacity, and inability to maximally participating in fitness/wellness. Pt was provided with a basic HEP focused on neurodynamics, cervical mobility, and DNF training which will be reviewed in the upcoming session. Pt " able to demonstrate HEP with good technique and no pain. Educated pt to stop any exercises causing pain increase, pt verbalizes understanding. Pt was educated on anatomy and physiology of diagnosis and demonstrated verbal understanding. Positive prognostic indicators include positive attitude toward recovery, good understanding of diagnosis and treatment plan options, and acuity of symptoms. Negative prognostic indicators include high symptom irritability. Pt would benefit from skilled OP physical therapy to address these, and the below impairments in order to optimize outcomes and promote return to functional baseline.     Patient verbalized understanding of POC.    Please contact me if you have any questions or recommendations. Thank you for the referral and the opportunity to share in [facility]'s care      Understanding of Dx/Px/POC: good     Prognosis: good    Goals    Short Term Goals:  In 2-4 weeks, the patient will:  1. Pt will report at least a 25% reduction in subjective pain complaints/symptoms to better manage ADLs and household chores.   2. Pt will be able to self-centralize using repeated extension and neurodynamics  3. Pt independent with initial HEP, rationale, technique and frequency, for ROM and pain control.      Long Term Goals:  In 8-12 weeks, the patient will:  1. Pt will have no peripherlization and returns to fitness  2. FOTO to greater than predicted value.  3. Independent with comprehensive HEP upon discharge.  4. Pt will be able to perform ADLs, iADLS, and household duties with minimal restriction indicating return to PLOF.  5. Pt independent with rationale, technique and plan for performance of advanced HEP to ensure independent self-management of symptoms upon discharge.     Plan  Patient would benefit from: PT eval, skilled physical therapy and home program  Referral necessary: No  Planned modality interventions: cryotherapy, biofeedback, TENS and high voltage pulsed current: pain  management    Planned therapy interventions: activity modification, ADL retraining, manual therapy, massage, behavior modification, body mechanics training, muscle pump exercises, motor coordination training, nerve gliding, neuromuscular re-education, patient education, postural training, community reintegration, self care, sensory integrative techniques, strengthening, stretching, therapeutic activities, therapeutic exercise, therapeutic training, home exercise program, graded motor, graded exercise, graded activity, functional ROM exercises, abdominal trunk stabilization, patient/caregiver education and joint mobilization    Frequency: 1-2x week  Plan of Care beginning date: 7/9/2025  Plan of Care expiration date: 9/17/2025  Treatment plan discussed with: patient        Subjective Evaluation    History of Present Illness  Mechanism of injury: IE 7/9 : Patient presents for PT initial evaluation regarding concerns of ongoing left arm and shoulder pain.  Went to the ED on 6/19/25 and was cleared medically. Followed with Ortho on 6/23 due to concerns of shoulder pain, however, symptoms appear to be related to cervical neural involvement. He went later to follow-up with Neurology in [place] and was recommended to try physical therapy.     He described his symptoms of paresthesias to his left index finger and occasionally his entire palm. He had an MRI of the cervical spine that was normal except for a central/left paracentral disc herniation and protrusion with moderate left neural foraminal narrowing, mild right neural foraminal narrowing, and mild central canal narrowing. MRI of brain was normal.     Denies any red flags which include: fever, chills, chest pain, focal neurologic deficits, urinary distention, urinary incontinence, fecal incontinence, saddle anesthesia.      Personal Functional Goals: to feel normal            Not a recurrent problem   Quality of life: good    Patient Goals  Patient goals for therapy:  increased strength, independence with ADLs/IADLs, increased motion, return to sport/leisure activities and decreased pain    Pain  Current pain ratin  At worst pain rating: 10  Location: Left Arm  Quality: needle-like, radiating, pulling, discomfort and sharp  Relieving factors: medications  Aggravating factors: keyboarding and lifting  Progression: improved    Social Support  Lives in: multiple-level home  Lives with: spouse    Employment status: working ()  Exercise history: Active      Diagnostic Tests  X-ray: abnormal (Multilevel spondylosis most pronounced at C6-7. No cord compression or cord signal abnormality.)  Abnormal EMG results: Needle EMG of muscles in the left upper extremity and left mid/lower cervical paraspinal muscles was normal, except for fibrillations and positive sharp waves in the left pronator teres and left C6/C7 paraspinal muscles..  Treatments  Previous treatment: medication and chiropractic  Current treatment: physical therapy        Objective    Postural Findings:              Head Position x Protracted  Neutral  Retracted   Scapular Position  Protracted x Neutral  Retracted   Thoracic Spine  Inc Kyphosis x Neutral         NEURO Screen  Reflexes  Right Left   Biceps (C5-C6) 2+ 2+   Brachioradialis (C5-C6) 2+ 2+   Triceps (C7-C8) 2+ 2+   Tyler's N N   Clonus N N       UE Myotomes:  Nerve Root Test Action RIGHT:   2025     LEFT:   2025         C3 Neck side flexion intact intact   C4 Shoulder elevation intact intact   C5 Shoulder abduction intact intact   C6 Elbow Flexion and wrist extension intact intact   C7 Elbow extension and wrist flexion intact intact   C8 Thumb extension and ulnar deviation intact intact   T1 Hand intrinsics intact intact     UE Dermatomes  Nerve Root Test Action RIGHT:   2025 LEFT:   2025       C2 Yazidi/Occipital Protuberance intact intact   C3 Lateral Neck intact intact   C4 Clavicle/Upper Trap intact intact   C5 Deltoid  to Lateral Epicondyle intact intact   C6 Thumb intact States numbness   C7 Index Finger  States numbness   C8 5th Digit intact intact   T1 Medial Antecubital Fossa/Forearm intact intact         Cervical Vascular Screenin-D's   Dizziness   Diploplia   Drop Attacks   Dysphagia   Dysarthria  3-N's   Nausea   Nystagmus    Numbness  The above were all  Negative      Upper Cervical Ligament Testing:   Transverse ligament stress test   Alar Ligament stress test   Sharp-Saray   *The above were all  Negative    Strength and ROM evaluated B from a regional biomechanical perspective and values relevant to this episode recorded in tables below    Range of Motion measurements for the Cervical Spine (in degrees)     Joint Motion Right:   2025   Left:   2025     Flexion WNL -   Extension WNL -   Rotation WNL    Lateral Flexion WNL    Protraction WNL    Retraction WNL      UE Manual Muscle Testing  Test Action RIGHT   2025   LEFT   2025     Shoulder elevation 5/5 5/5   Shoulder flexion 5/5 5/5   Shoulder abduction 5/5 5/5   Shoulder IR 5/5 5/5   Shoulder ER 5/5 5/5   Elbow Flexion 5/5 5/5   Elbow extension 5/5 5/5       Additional Clinical Tests:  Directional Preference: Extension      Cervical Radiculopathy Test Item Cluster (Juan RS et al. SPINE, 2003)  Any 3 above + = + LR of 6.1  Any 4 above + = + LR of 30.3  Test / Measure  2025   Upper Limb Tension Test A P   Spurling's A N   Distraction N   C-Rotation < 60 ipsilateral side N         Dural Mobility:   Median Nerve:Positive with elbow extension  Radial Nerve: Negative  Ulnar Nerve: Negative      Repeated Motions  C/s retractions: Improved           Precautions: Past Medical History[1]    POC expires Unit limit Auth Expiration date PT/OT + Visit Limit?   25 bom 25 30 PCY         Visit/Unit Tracking  AUTH Status:  Date         30 PCY Used 1         Remaining  29           Pertinent Findings:                                                "                                              Test / Measure  7/9/25   FOTO (Predicted )                  Access Code: [email or web address]  URL: [email or web address]  Access Code: [email or web address]  URL: [email or web address]  Date: 07/09/2025  Prepared by: Isadora Mackey    Exercises  - Supine Cervical Retraction with Towel  - 1-2 x daily - 7 x weekly - 2 sets - 10 reps - 5 second hold  - Cervical Retraction with Overpressure  - 1-2 x daily - 7 x weekly - 3 sets - 10 reps - 5 second hold  - Cervical Extension AROM with Strap  - 1 x daily - 7 x weekly - 2 sets - 10 reps  - Mid-Lower Cervical Extension SNAG with Strap  - 1 x daily - 7 x weekly - 2 sets - 10 reps  - Median Nerve Flossing - Tray  - 4-5 x daily - 7 x weekly - 1 sets - 10 reps  - Median nerve abduction sliders  - 4-5 x daily - 7 x weekly - 1 sets - 10 reps  - Seated Thoracic Lumbar Extension  - 1-2 x daily - 7 x weekly - 1-2 sets - 10 reps    Manuals 7/9                     Cervical PROM AR                     Cervical Lateral Gliding AR                     SOR + Distraction AR                     IASTM left cervical upper trap                       Thoracic Grade V mobilization                      Neuro Re-Ed                      HEP/Patient Education/PNE AR                     Supine and Seated Cervical Retraction 5\"x10 ea                     Prone Scap Squeezes                       Prone Ws Lifts                       Median Nerve Glides  2x10  B/L                     Repeated Thoracic Extension  10x                                                                                             Ther Ex                       UBE Retro                       Doorway Pec Stretch                                                                                                                                                                       Ther Activity                                                                       Gait Training                "                                                        Modalities                                                                                      [1]   Past Medical History:  Diagnosis Date    Cholecystitis     Obstructive sleep apnea

## 2025-07-09 ENCOUNTER — EVALUATION (OUTPATIENT)
Dept: PHYSICAL THERAPY | Facility: REHABILITATION | Age: 55
End: 2025-07-09
Payer: COMMERCIAL

## 2025-07-09 DIAGNOSIS — M54.12 RADICULOPATHY, CERVICAL REGION: ICD-10-CM

## 2025-07-09 PROCEDURE — 97161 PT EVAL LOW COMPLEX 20 MIN: CPT

## 2025-07-09 PROCEDURE — 97112 NEUROMUSCULAR REEDUCATION: CPT

## 2025-07-09 PROCEDURE — 97140 MANUAL THERAPY 1/> REGIONS: CPT

## 2025-07-10 ENCOUNTER — OFFICE VISIT (OUTPATIENT)
Dept: PHYSICAL THERAPY | Facility: REHABILITATION | Age: 55
End: 2025-07-10
Payer: COMMERCIAL

## 2025-07-10 DIAGNOSIS — M54.12 RADICULOPATHY, CERVICAL REGION: Primary | ICD-10-CM

## 2025-07-10 PROCEDURE — 97140 MANUAL THERAPY 1/> REGIONS: CPT

## 2025-07-10 PROCEDURE — 97112 NEUROMUSCULAR REEDUCATION: CPT

## 2025-07-10 NOTE — PROGRESS NOTES
Daily Note     Today's date: 7/10/2025  Patient name: Arelis Mayes  : 1970  MRN: [id number]  Referring provider: Jesu Farfan*  Dx:   Encounter Diagnosis     ICD-10-CM    1. Radiculopathy, cervical region  M54.12           Start Time: 1230  Stop Time: 1315  Total time in clinic (min): 45 minutes    Subjective: He overdid it with DNF training, felt soreness of cervical paraspinals. He notes improvement in his UE, however, B/L LE fasciculations and intermittent neurogenic symptoms remain. Reports history of lower back issues.       Objective: See treatment diary below      Assessment: Patient tolerated treatment session well today with focus on reviewing HEP with beginning cervico-thoracic strengthening. Responded positively to all interventions with no increase in digit numbness or pain into L UE. Patient will continue to be appropriate for skilled outpatient physical therapy in order to address impairments. 1:1 with Isadora Mackey, PT, DPT for entirety of treatment session.        Plan: Continue per plan of care.      Precautions: Past Medical History[1]    POC expires Unit limit Auth Expiration date PT/OT + Visit Limit?   25 bom 25 30 PCY         Visit/Unit Tracking  AUTH Status:  Date 7/9 7/10       30 PCY Used 1 2                  Pertinent Findings:                                                                                             Test / Measure  25   FOTO (Predicted )                  Access Code: [email or web address]  URL: [email or web address]  Access Code: [email or web address]  URL: [email or web address]  Date: 2025  Prepared by: Isadora Mackey    Exercises  - Supine Cervical Retraction with Towel  - 1-2 x daily - 7 x weekly - 2 sets - 10 reps - 5 second hold  - Cervical Retraction with Overpressure  - 1-2 x daily - 7 x weekly - 3 sets - 10 reps - 5 second hold  - Cervical Extension AROM with Strap  - 1 x daily - 7 x weekly - 2 sets - 10 reps  -  "Mid-Lower Cervical Extension SNAG with Strap  - 1 x daily - 7 x weekly - 2 sets - 10 reps  - Median Nerve Flossing - Tray  - 4-5 x daily - 7 x weekly - 1 sets - 10 reps  - Median nerve abduction sliders  - 4-5 x daily - 7 x weekly - 1 sets - 10 reps  - Seated Thoracic Lumbar Extension  - 1-2 x daily - 7 x weekly - 1-2 sets - 10 reps    Manuals 7/9  7/10                   Cervical PROM AR  AR                   Cervical Lateral Gliding AR  AR                   SOR + Distraction AR  AR                   IASTM left cervical upper trap                       Thoracic Grade V mobilization                      Neuro Re-Ed                      HEP/Patient Education/PNE AR                     Supine and Seated Cervical Retraction 5\"x10 ea  Seated with cervical extension  20x                   Prone Scap Squeezes    5\"x20                   Prone Scap Lifts  5\"x20           Prone Ws Lifts    5\"x20                   Median Nerve Glides  2x10  B/L  tray  2x10  B/L                   Repeated Thoracic Extension  10x  2x10                                                                                           Ther Ex                       UBE Retro                       Doorway Pec Stretch                                                                                                                                                                       Ther Activity                                                                       Gait Training                                                                       Modalities                                                                                         [1]   Past Medical History:  Diagnosis Date    Cholecystitis     Obstructive sleep apnea      "

## 2025-07-16 ENCOUNTER — OFFICE VISIT (OUTPATIENT)
Dept: PHYSICAL THERAPY | Facility: REHABILITATION | Age: 55
End: 2025-07-16
Payer: COMMERCIAL

## 2025-07-16 DIAGNOSIS — R25.3 MUSCLE FASCICULATION: Primary | ICD-10-CM

## 2025-07-16 DIAGNOSIS — E53.8 VITAMIN B12 DEFICIENCY: ICD-10-CM

## 2025-07-16 DIAGNOSIS — F41.9 ANXIETY: ICD-10-CM

## 2025-07-16 DIAGNOSIS — M54.12 RADICULOPATHY, CERVICAL REGION: Primary | ICD-10-CM

## 2025-07-16 PROCEDURE — 97110 THERAPEUTIC EXERCISES: CPT

## 2025-07-16 PROCEDURE — 97140 MANUAL THERAPY 1/> REGIONS: CPT

## 2025-07-16 PROCEDURE — 97112 NEUROMUSCULAR REEDUCATION: CPT

## 2025-07-16 RX ORDER — ALPRAZOLAM 0.25 MG
0.25 TABLET ORAL 3 TIMES DAILY PRN
Qty: 30 TABLET | Refills: 2 | Status: SHIPPED | OUTPATIENT
Start: 2025-07-16

## 2025-07-16 RX ORDER — ESCITALOPRAM OXALATE 10 MG/1
10 TABLET ORAL DAILY
Qty: 30 TABLET | Refills: 5 | Status: SHIPPED | OUTPATIENT
Start: 2025-07-16 | End: 2026-01-12

## 2025-07-16 NOTE — PROGRESS NOTES
Addendum to Chart:     Arelis contacted me yesterday and requested a phone call to discuss her current clinical situation.    R has a symptomatic cervical radiculopathy is making excellent progress in physical therapy.adenike's to the physical therapist has been great.    He also reports that his paresthesias have resolved on B12 supplementation started last month for low B12 level.    However, he is noticing diffuse muscle fasciculations, without weakness, and this is triggering significant anxiety secondarily.    He contacted his brother-in-law, who is a neurosurgeon at [facility], who arranged for him to see a neurologist there.  EMG studies of the upper extremities are consistent with cervical radiculopathy unilaterally.  Apparently there is no signs of a primary myopathic disorder.    Arelis is very concerned that he may have a neurologic condition that has been missed, such as ALS.  He reports no focal or generalized weakness, no difficulty swallowing, no bulbar symptoms on review.    He was taking Xanax which temporarily is his anxiety, but is anxious all the time and is having a hard time functioning.      Plan is to continue medical work up and I did enter additional work today, it is last month's lab, will have these drawn tomorrow morning.    Arelis would like a second opinion from [facility] neurology and I entered an ASAP consult.    I will continue follow-up path results are available.

## 2025-07-16 NOTE — PROGRESS NOTES
Daily Note     Today's date: 2025  Patient name: Arelis Mayes  : 1970  MRN: [id number]  Referring provider: Jesu Farfan*  Dx:   Encounter Diagnosis     ICD-10-CM    1. Radiculopathy, cervical region  M54.12           Start Time: 0930  Stop Time: 1020  Total time in clinic (min): 50 minutes    Subjective: He reports continue UE and LE fasciculations, is going to contact PCP to express concerns. Neck pain has improved significantly.       Objective: See treatment diary below      Assessment: Patient tolerated treatment session well today with focus on reviewing HEP with beginning cervico-thoracic strengthening. Responded positively to all interventions with no increase in digit numbness or pain into L UE. Demonstrated equal strength bilaterally with Mary Jane training. Patient will continue to be appropriate for skilled outpatient physical therapy in order to address impairments. 1:1 with Isadora Mackey, PT, DPT for entirety of treatment session.        Plan: Continue per plan of care.      Precautions: Past Medical History[1]    POC expires Unit limit Auth Expiration date PT/OT + Visit Limit?   25 bom 25 30 PCY         Visit/Unit Tracking  AUTH Status:  Date 7/9 7/10 7/16      30 PCY Used 1 2 3       Remaining  29 28 27         Pertinent Findings:                                                                                             Test / Measure  25   FOTO (Predicted )                  Access Code: [email or web address]  URL: [email or web address]  Access Code: [email or web address]  URL: [email or web address]  Date: 2025  Prepared by: Isadora Mackey    Exercises  - Supine Cervical Retraction with Towel  - 1-2 x daily - 7 x weekly - 2 sets - 10 reps - 5 second hold  - Cervical Retraction with Overpressure  - 1-2 x daily - 7 x weekly - 3 sets - 10 reps - 5 second hold  - Cervical Extension AROM with Strap  - 1 x daily - 7 x weekly - 2 sets - 10 reps  - Mid-Lower Cervical  "Extension SNAG with Strap  - 1 x daily - 7 x weekly - 2 sets - 10 reps  - Median Nerve Flossing - Tray  - 4-5 x daily - 7 x weekly - 1 sets - 10 reps  - Median nerve abduction sliders  - 4-5 x daily - 7 x weekly - 1 sets - 10 reps  - Seated Thoracic Lumbar Extension  - 1-2 x daily - 7 x weekly - 1-2 sets - 10 reps    Manuals 7/9  7/10  7/16                 Cervical PROM AR  AR  AR                 Cervical Lateral Gliding AR  AR  AR                 SOR + Distraction AR  AR  AR                 IASTM left cervical upper trap     percussion  AR                  Thoracic Grade V mobilization     AR                 Neuro Re-Ed                      HEP/Patient Education/PNE AR    AR performed                 Supine and Seated Cervical Retraction 5\"x10 ea  Seated with cervical extension  20x  Seated with cervical extension  20x                 Prone Scap Squeezes    5\"x20  5\"x20                 Prone Scap Lifts  5\"x20 5\"x20          Prone Ws Lifts    5\"x20  5\"x20                 Median Nerve Glides  2x10  B/L  tray  2x10  B/L  tray  2x10  B/L                 Repeated Thoracic Extension  10x  2x10  2x10                 Single Shoulder Extension      #10  2x10  B/L                  Rear Fly      #3  2x10  B/L                 Pushup on Wall      10x                 Ther Ex                       UBE Retro      5'                 Doorway Pec Stretch                                                                                                                                                                       Ther Activity                                                                       Gait Training                                                                       Modalities                                                                                         [1]   Past Medical History:  Diagnosis Date    Cholecystitis     Obstructive sleep apnea      "

## 2025-07-17 ENCOUNTER — APPOINTMENT (OUTPATIENT)
Dept: LAB | Facility: CLINIC | Age: 55
End: 2025-07-17
Payer: COMMERCIAL

## 2025-07-17 DIAGNOSIS — E78.00 PURE HYPERCHOLESTEROLEMIA: ICD-10-CM

## 2025-07-17 DIAGNOSIS — E53.8 VITAMIN B12 DEFICIENCY: ICD-10-CM

## 2025-07-17 DIAGNOSIS — R25.3 MUSCLE FASCICULATION: ICD-10-CM

## 2025-07-17 LAB
BASOPHILS # BLD AUTO: 0.03 THOUSANDS/ÂΜL (ref 0–0.1)
BASOPHILS NFR BLD AUTO: 1 % (ref 0–1)
CHOLEST SERPL-MCNC: 155 MG/DL (ref ?–200)
CK SERPL-CCNC: 63 U/L (ref 39–308)
CORTIS SERPL-MCNC: 12.8 UG/DL
CRP SERPL QL: 2 MG/L
EOSINOPHIL # BLD AUTO: 0.39 THOUSAND/ÂΜL (ref 0–0.61)
EOSINOPHIL NFR BLD AUTO: 6 % (ref 0–6)
ERYTHROCYTE [DISTWIDTH] IN BLOOD BY AUTOMATED COUNT: 13.1 % (ref 11.6–15.1)
EST. AVERAGE GLUCOSE BLD GHB EST-MCNC: 117 MG/DL
FERRITIN SERPL-MCNC: 79 NG/ML (ref 30–336)
HBA1C MFR BLD: 5.7 %
HCT VFR BLD AUTO: 43.3 % (ref 36.5–49.3)
HDLC SERPL-MCNC: 38 MG/DL
HGB BLD-MCNC: 14.5 G/DL (ref 12–17)
IMM GRANULOCYTES # BLD AUTO: 0.03 THOUSAND/UL (ref 0–0.2)
IMM GRANULOCYTES NFR BLD AUTO: 1 % (ref 0–2)
IRON SATN MFR SERPL: 29 % (ref 15–50)
IRON SERPL-MCNC: 109 UG/DL (ref 50–212)
LDLC SERPL CALC-MCNC: 88 MG/DL (ref 0–100)
LYMPHOCYTES # BLD AUTO: 2.23 THOUSANDS/ÂΜL (ref 0.6–4.47)
LYMPHOCYTES NFR BLD AUTO: 36 % (ref 14–44)
MCH RBC QN AUTO: 26.6 PG (ref 26.8–34.3)
MCHC RBC AUTO-ENTMCNC: 33.5 G/DL (ref 31.4–37.4)
MCV RBC AUTO: 79 FL (ref 82–98)
MONOCYTES # BLD AUTO: 0.53 THOUSAND/ÂΜL (ref 0.17–1.22)
MONOCYTES NFR BLD AUTO: 9 % (ref 4–12)
NEUTROPHILS # BLD AUTO: 2.95 THOUSANDS/ÂΜL (ref 1.85–7.62)
NEUTS SEG NFR BLD AUTO: 47 % (ref 43–75)
NONHDLC SERPL-MCNC: 117 MG/DL
NRBC BLD AUTO-RTO: 0 /100 WBCS
PLATELET # BLD AUTO: 187 THOUSANDS/UL (ref 149–390)
PMV BLD AUTO: 11 FL (ref 8.9–12.7)
RBC # BLD AUTO: 5.46 MILLION/UL (ref 3.88–5.62)
TIBC SERPL-MCNC: 371 UG/DL (ref 250–450)
TRANSFERRIN SERPL-MCNC: 265 MG/DL (ref 203–362)
TRIGL SERPL-MCNC: 145 MG/DL (ref ?–150)
UIBC SERPL-MCNC: 262 UG/DL (ref 155–355)
VIT B12 SERPL-MCNC: 361 PG/ML (ref 180–914)
WBC # BLD AUTO: 6.16 THOUSAND/UL (ref 4.31–10.16)

## 2025-07-17 PROCEDURE — 82533 TOTAL CORTISOL: CPT

## 2025-07-17 PROCEDURE — 86038 ANTINUCLEAR ANTIBODIES: CPT

## 2025-07-17 PROCEDURE — 86140 C-REACTIVE PROTEIN: CPT

## 2025-07-17 PROCEDURE — 82085 ASSAY OF ALDOLASE: CPT

## 2025-07-17 PROCEDURE — 82550 ASSAY OF CK (CPK): CPT

## 2025-07-17 PROCEDURE — 82088 ASSAY OF ALDOSTERONE: CPT

## 2025-07-17 PROCEDURE — 80061 LIPID PANEL: CPT

## 2025-07-17 PROCEDURE — 83918 ORGANIC ACIDS TOTAL QUANT: CPT

## 2025-07-17 PROCEDURE — 86340 INTRINSIC FACTOR ANTIBODY: CPT

## 2025-07-17 PROCEDURE — 83540 ASSAY OF IRON: CPT

## 2025-07-17 PROCEDURE — 86225 DNA ANTIBODY NATIVE: CPT

## 2025-07-17 PROCEDURE — 83550 IRON BINDING TEST: CPT

## 2025-07-17 PROCEDURE — 82728 ASSAY OF FERRITIN: CPT

## 2025-07-17 PROCEDURE — 36415 COLL VENOUS BLD VENIPUNCTURE: CPT

## 2025-07-18 ENCOUNTER — HOSPITAL ENCOUNTER (OUTPATIENT)
Dept: NEUROLOGY | Facility: CLINIC | Age: 55
End: 2025-07-18
Attending: PSYCHIATRY & NEUROLOGY
Payer: COMMERCIAL

## 2025-07-18 ENCOUNTER — OFFICE VISIT (OUTPATIENT)
Dept: NEUROLOGY | Facility: CLINIC | Age: 55
End: 2025-07-18
Payer: COMMERCIAL

## 2025-07-18 VITALS
SYSTOLIC BLOOD PRESSURE: 118 MMHG | HEART RATE: 80 BPM | OXYGEN SATURATION: 98 % | HEIGHT: 71 IN | BODY MASS INDEX: 28.56 KG/M2 | RESPIRATION RATE: 20 BRPM | DIASTOLIC BLOOD PRESSURE: 70 MMHG | TEMPERATURE: 97.9 F | WEIGHT: 204 LBS

## 2025-07-18 DIAGNOSIS — M54.12 CERVICAL RADICULOPATHY: Primary | ICD-10-CM

## 2025-07-18 DIAGNOSIS — R25.3 MUSCLE FASCICULATION: ICD-10-CM

## 2025-07-18 LAB — ALDOLASE SERPL-CCNC: 3.7 U/L (ref 3.3–10.3)

## 2025-07-18 PROCEDURE — 99204 OFFICE O/P NEW MOD 45 MIN: CPT | Performed by: PSYCHIATRY & NEUROLOGY

## 2025-07-18 PROCEDURE — 95886 MUSC TEST DONE W/N TEST COMP: CPT | Performed by: PSYCHIATRY & NEUROLOGY

## 2025-07-18 PROCEDURE — 95910 NRV CNDJ TEST 7-8 STUDIES: CPT | Performed by: PSYCHIATRY & NEUROLOGY

## 2025-07-18 RX ORDER — LANOLIN ALCOHOL/MO/W.PET/CERES
1000 CREAM (GRAM) TOPICAL DAILY
Qty: 90 TABLET | Refills: 1 | Status: SHIPPED | OUTPATIENT
Start: 2025-07-18

## 2025-07-18 NOTE — PROGRESS NOTES
Name: Arelis Mayes      : 1970      MRN: [id number]  Encounter Provider: Toshia Haas MD  Encounter Date: 2025   Encounter department: NEUROLOGY [facility]  :  Assessment & Plan  Muscle fasciculation  This patient had left-sided radicular symptoms in the upper extremity 3 to 4 weeks ago with neck pain, paresthesias in the left upper extremity, which has since improved.  However started noticing muscle twitching in different parts of the body soon after these symptoms, in his calves, feet, occasionally in the thighs, and upper extremities.  Has noted here and there tingling in the toes, and in his hands.  Prior EMG from  in [place] was reviewed, subacute C7 radiculopathy in the left upper extremity.    Exam did not reveal any atrophy, fasciculations today, cranial nerve exam was normal, I did not appreciate any objective muscle weakness in either upper or lower extremities, with normal reflexes.  Very minimal sensory loss was noted to temperature up to mid feet, with normal vibration and proprioception.    Reassured him the symptoms are not consistent with a generalized neurodegenerative disease such as ALS.  Due to his concern and extreme anxiety about the potential diagnosis, EMG was performed for his lower extremities today, minimal changes were noted in bilateral superficial peroneal sensory responses, with normal sural sensory studies.  This can be considered normal for this patient's age, or could be suggestive of mild early sensory neuropathy.  Patient does have prediabetes, as well as noted to have low B12 levels, likely contributing to the minimal changes noted on EMG.    My assessment and plan was discussed at length with the patient, he will continue vitamin B12 1000 mcg a day, and continue to exercise on a regular basis.  He has my contact information for any questions or concerns.      Orders:    Ambulatory Referral to Neurology    EMG 2 limb lower extremity;  Future    Cervical radiculopathy               History of Present Illness   HPI   I had the pleasure of seeing the patient in neurology clinic for neuromuscular consultation.  As you know, patient is a 54-year-old man, who was referred for evaluation for muscle fasciculations.  Please allow me to summarize his history for the record.    3-4 weeks ago, started having numbness in the left arm, left side of jaw, and left side of neck, upper chest.   He was seen in ED, cardiac work up was negative.   Since then, he started noticing muscle twitching in both arms and lower extremities.   He has been extremely anxious with all these symptoms, with a concern of motor neuron disease.  Most of his initial numbness has resoved, only gets tingling here and there in the right hand, occasionally left hand, and noted some pain in both ankles (mostly medial malleoli), and some numbness in the left big toe.   He feels he has muscle twitching in different parts of the body, in the feet (medial aspect of soles), calves, left triceps.   These are not constant, come and go and happening more often. He notices it more when he is resting.   Some weakness in the left arm, which ahs improved.  No weakness in either lower extremity.  No change in speech or swallowing, or shortness of breath.  Occasional cramps in the hamstrings and the calves.     He works out on a regular basis, works with a , has not worked out in the last 4 weeks because of the neck pain, but has been walking >10,000 steps regularly.  He reports he was able to do push-ups again today as well.    He is a .     Family hx: Dad is 97, had recent issues with the heart valve, Mom is 93, she has dementia, has had bladder and gallbladder cancer, and some autoimmune disease with liver.    No history of neuromuscular disease in the family.    He is not a smoker, rare alcohol.    WORKUP:  EMG July 1, 2025 in [facility], study interpreted as  subacute C6-7 radiculopathy with presence of fibrillation potentials and positive sharp waves in the left pronator teres and left C6-7 paraspinal muscles, the remainder of the study was reported normal.    MRI cervical spine: June 24, 2025 central/left paracentral disc herniation at C6-7, mild central canal narrowing, moderate left neuroforaminal narrowing, mild right neuroforaminal narrowing.  No other cord signal abnormality.    MRI brain June 24, 2025 unremarkable, study personally reviewed by me as a part of this evaluation.    Labs: July 2025 iron panel negative, ferritin 79, EDMUNDO pending, CRP 2, CK 63, aldolase pending.  Total cholesterol 155, triglycerides 144, HDL 38, LDL 88.  Hemoglobin A1c 5.7.  Vitamin B12 361, CBC normal.  June 2025 Lyme negative, TSH 2.2, vitamin D24.3.  Folate normal.  Vitamin B12 159.      Review of Systems   Constitutional: Negative.    HENT: Negative.     Eyes: Negative.    Respiratory: Negative.     Cardiovascular: Negative.    Gastrointestinal: Negative.    Endocrine: Negative.    Genitourinary: Negative.    Musculoskeletal:  Positive for arthralgias (Ankle pain) and neck pain.   Allergic/Immunologic: Negative.    Neurological:  Positive for numbness.        Muscle twitching   Hematological: Negative.    Psychiatric/Behavioral:  The patient is nervous/anxious.     I have personally reviewed the MA's review of systems and made changes as necessary.         Objective   Temperature 97.9, heart rate 80, blood pressure 118/70, 98% on room air.    Physical Exam  General exam: Pt was awake, alert and oriented.   HEENT: atraumatic, normocephalic.  Normal oral mucosa, neck was supple, no lymphadenopathy.  Normal peripheral pulses.   Extremities did not show any edema or cyanosis.    Neurological Exam  Neurologically, pt was awake and alert.  Speech was normal, no dysarthria or aphasia.   Cranial nerve exam showed normal extraocular movements, no nystagmus or diplopia.   There was no ptosis  "at baseline or with sustained upward gaze.   Strength of eye closure muscles was normal.  Facial sensations were normal bilaterally.   No facial weakness, able to blow out the cheeks and push the tongue in the cheeks well.   No tongue atrophy or fasciculations.  Motor exam revealed normal tone and muscle bulk. There was no atrophy, scapular winging, high arches, hammertoes, shortening of achilles tendons or any other features of neuromuscular disease.   Muscle strength was normal in neck flexors and extensors, and all muscle groups in both upper and lower extremities.  Reflexes were graded as 2+ throughout, including the ankles.  Toes were downgoing. There was no exaggerated jaw jerk or tam's sign. No ankle clonus.  Sensation was minimally reduced in both feet up to mid feet bilaterally to temperature, vibration was 15 seconds at the toes, normal proprioception.  Sensation was normal in the upper extremities.    Gait was casual and normal.      Portions of the record may have been created with voice recognition software. Occasional wrong word or \"sound a like\" substitutions may have occurred due to the inherent limitations of voice recognition software. Read the chart carefully and recognize, using context, where substitutions have occurred.           "

## 2025-07-18 NOTE — ASSESSMENT & PLAN NOTE
This patient had left-sided radicular symptoms in the upper extremity 3 to 4 weeks ago with neck pain, paresthesias in the left upper extremity, which has since improved.  However started noticing muscle twitching in different parts of the body soon after these symptoms, in his calves, feet, occasionally in the thighs, and upper extremities.  Has noted here and there tingling in the toes, and in his hands.  Prior EMG from July 1 in [place] was reviewed, subacute C7 radiculopathy in the left upper extremity.    Exam did not reveal any atrophy, fasciculations today, cranial nerve exam was normal, I did not appreciate any objective muscle weakness in either upper or lower extremities, with normal reflexes.  Very minimal sensory loss was noted to temperature up to mid feet, with normal vibration and proprioception.    Reassured him the symptoms are not consistent with a generalized neurodegenerative disease such as ALS.  Due to his concern and extreme anxiety about the potential diagnosis, EMG was performed for his lower extremities today, minimal changes were noted in bilateral superficial peroneal sensory responses, with normal sural sensory studies.  This can be considered normal for this patient's age, or could be suggestive of mild early sensory neuropathy.  Patient does have prediabetes, as well as noted to have low B12 levels, likely contributing to the minimal changes noted on EMG.    My assessment and plan was discussed at length with the patient, he will continue vitamin B12 1000 mcg a day, and continue to exercise on a regular basis.  He has my contact information for any questions or concerns.      Orders:    Ambulatory Referral to Neurology    EMG 2 limb lower extremity; Future

## 2025-07-20 LAB — IF BLOCK AB SER QL RIA: 1 AU/ML (ref 0–1.1)

## 2025-07-21 LAB
ALDOST SERPL-MCNC: 2.7 NG/DL (ref 0–30)
METHYLMALONATE SERPL-SCNC: 102 NMOL/L (ref 0–378)

## 2025-07-23 ENCOUNTER — OFFICE VISIT (OUTPATIENT)
Dept: PHYSICAL THERAPY | Facility: REHABILITATION | Age: 55
End: 2025-07-23
Payer: COMMERCIAL

## 2025-07-23 DIAGNOSIS — M54.12 RADICULOPATHY, CERVICAL REGION: Primary | ICD-10-CM

## 2025-07-23 PROCEDURE — 97110 THERAPEUTIC EXERCISES: CPT

## 2025-07-23 PROCEDURE — 97140 MANUAL THERAPY 1/> REGIONS: CPT

## 2025-07-23 PROCEDURE — 97112 NEUROMUSCULAR REEDUCATION: CPT

## 2025-07-23 NOTE — PROGRESS NOTES
Daily Note     Today's date: 2025  Patient name: Arelis Mayes  : 1970  MRN: [id number]  Referring provider: Jesu Farfan*  Dx:   Encounter Diagnosis     ICD-10-CM    1. Radiculopathy, cervical region  M54.12           Start Time: 1445  Stop Time: 1531  Total time in clinic (min): 46 minutes    Subjective: He reports 80% improvement in his UE/neck symptoms. He continues to have LE fasciculations.      Objective: See treatment diary below      Assessment: Patient tolerated treatment session well today. Introduced LE loading to see how he responds. Neurodynamics continue to be most beneficial for him in addition to repeated motions both for cervical and lumbar spine. We continue to discuss PNE and body hyper-awareness and it's impact. Patient will continue to be appropriate for skilled outpatient physical therapy in order to address impairments. 1:1 with Isadora Mackey, PT, DPT for entirety of treatment session.        Plan: Continue per plan of care.      Precautions: Past Medical History[1]    POC expires Unit limit Auth Expiration date PT/OT + Visit Limit?   25 bom 25 30 PCY         Visit/Unit Tracking  AUTH Status:  Date 7/9 7/10 7/16 7/23     30 PCY Used 1 2 3 4      Remaining  29 28 27 26        Pertinent Findings:                                                                                             Test / Measure  25   FOTO (Predicted )                  Access Code: [email or web address]  URL: [email or web address]  Access Code: [email or web address]  URL: [email or web address]  Date: 2025  Prepared by: Isadora Mackey    Exercises  - Supine Cervical Retraction with Towel  - 1-2 x daily - 7 x weekly - 2 sets - 10 reps - 5 second hold  - Cervical Retraction with Overpressure  - 1-2 x daily - 7 x weekly - 3 sets - 10 reps - 5 second hold  - Cervical Extension AROM with Strap  - 1 x daily - 7 x weekly - 2 sets - 10 reps  - Mid-Lower Cervical Extension SNAG with Strap  -  "1 x daily - 7 x weekly - 2 sets - 10 reps  - Median Nerve Flossing - Tray  - 4-5 x daily - 7 x weekly - 1 sets - 10 reps  - Median nerve abduction sliders  - 4-5 x daily - 7 x weekly - 1 sets - 10 reps  - Seated Thoracic Lumbar Extension  - 1-2 x daily - 7 x weekly - 1-2 sets - 10 reps    Manuals 7/9  7/10  7/16  7/23               Cervical PROM AR  AR  AR  AR               Cervical Lateral Gliding AR  AR  AR  AR               SOR + Distraction AR  AR  AR  AR               IASTM left cervical upper trap     percussion  AR                  Thoracic Grade V mobilization     AR                 Neuro Re-Ed                      HEP/Patient Education/PNE AR    AR performed  AR               Supine and Seated Cervical Retraction 5\"x10 ea  Seated with cervical extension  20x  Seated with cervical extension  20x  Seated with cervical extension  20x               Prone Scap Squeezes    5\"x20  5\"x20  5\"x20               Prone Scap Lifts  5\"x20 5\"x20 5\"x20         Prone Ws Lifts    5\"x20  5\"x20  5\"x20               Median Nerve Glides  2x10  B/L  tray  2x10  B/L  tray  2x10  B/L  tray  2x10  B/L               Repeated Thoracic Extension  10x  2x10  2x10  2x10               Single Shoulder Extension      #10  2x10  B/L  #10  2x10  B/L                Rear Fly      #3  2x10  B/L  #3  2x10  B/L               Pushup on Wall      10x  15x off bar               Ther Ex                       UBE Retro      5'  6'               Doorway Pec Stretch                                                                                                                                                                       Ther Activity                                                                       Gait Training                                                                       Modalities                                                                                         [1]   Past Medical History:  Diagnosis Date    Cholecystitis     " Obstructive sleep apnea

## 2025-07-24 ENCOUNTER — OFFICE VISIT (OUTPATIENT)
Dept: PHYSICAL THERAPY | Facility: REHABILITATION | Age: 55
End: 2025-07-24
Payer: COMMERCIAL

## 2025-07-24 DIAGNOSIS — M54.12 RADICULOPATHY, CERVICAL REGION: Primary | ICD-10-CM

## 2025-07-24 PROCEDURE — 97110 THERAPEUTIC EXERCISES: CPT

## 2025-07-24 PROCEDURE — 97112 NEUROMUSCULAR REEDUCATION: CPT

## 2025-07-24 PROCEDURE — 97140 MANUAL THERAPY 1/> REGIONS: CPT

## 2025-07-24 NOTE — PROGRESS NOTES
Daily Note     Today's date: 2025  Patient name: Arelis Mayes  : 1970  MRN: [id number]  Referring provider: Jesu Farfan*  Dx:   Encounter Diagnosis     ICD-10-CM    1. Radiculopathy, cervical region  M54.12           Start Time: 1100  Stop Time: 1145  Total time in clinic (min): 45 minutes    Subjective: He reports 80% improvement in his UE/neck symptoms. He continues to have LE fasciculations intermittently. Is having some lower back pain along right superior gluteal region.       Objective: See treatment diary below      Assessment: Patient tolerated treatment session well today. Introduced LE loading to see how he responds. Neurodynamics continue to be most beneficial for him in addition to repeated motions both for cervical and lumbar spine. We continue to discuss PNE and body hyper-awareness and it's impact. Patient will continue to be appropriate for skilled outpatient physical therapy in order to address impairments. 1:1 with Isadora Mackey, PT, DPT for entirety of treatment session.        Plan: Continue per plan of care.      Precautions: Past Medical History[1]    POC expires Unit limit Auth Expiration date PT/OT + Visit Limit?   25 bom 25 30 PCY         Visit/Unit Tracking  AUTH Status:  Date 7/9 7/10 7/16 7/23 7/24    30 PCY Used 1 2 3 4 5     Remaining  29 28 27 26 25       Pertinent Findings:                                                                                             Test / Measure  25   FOTO (Predicted )                  Access Code: [email or web address]  URL: [email or web address]  Access Code: [email or web address]  URL: [email or web address]  Date: 2025  Prepared by: Isadora Mackey    Exercises  - Supine Cervical Retraction with Towel  - 1-2 x daily - 7 x weekly - 2 sets - 10 reps - 5 second hold  - Cervical Retraction with Overpressure  - 1-2 x daily - 7 x weekly - 3 sets - 10 reps - 5 second hold  - Cervical Extension AROM with Strap  - 1  "x daily - 7 x weekly - 2 sets - 10 reps  - Mid-Lower Cervical Extension SNAG with Strap  - 1 x daily - 7 x weekly - 2 sets - 10 reps  - Median Nerve Flossing - Tray  - 4-5 x daily - 7 x weekly - 1 sets - 10 reps  - Median nerve abduction sliders  - 4-5 x daily - 7 x weekly - 1 sets - 10 reps  - Seated Thoracic Lumbar Extension  - 1-2 x daily - 7 x weekly - 1-2 sets - 10 reps    Manuals 7/9  7/10  7/16  7/23  7/24             Cervical PROM AR  AR  AR  AR  AR             Cervical Lateral Gliding AR  AR  AR  AR  AR             SOR + Distraction AR  AR  AR  AR  AR             IASTM left cervical upper trap     percussion  AR                  Thoracic Grade V mobilization     AR                 Neuro Re-Ed                      HEP/Patient Education/PNE AR    AR performed  AR               Supine and Seated Cervical Retraction 5\"x10 ea  Seated with cervical extension  20x  Seated with cervical extension  20x  Seated with cervical extension  20x               Prone Scap Squeezes    5\"x20  5\"x20  5\"x20               Prone Scap Lifts  5\"x20 5\"x20 5\"x20         Prone Ws Lifts    5\"x20  5\"x20  5\"x20               Median Nerve Glides  2x10  B/L  tray  2x10  B/L  tray  2x10  B/L  tray  2x10  B/L  Tray  2x10  B/L             Repeated Thoracic Extension  10x  2x10  2x10  2x10              Single Shoulder Extension      #10  2x10  B/L  #10  2x10  B/L                Rear Fly      #3  2x10  B/L  #3  2x10  B/L  GTB  2x10  B/L             Pushup on Wall      10x  15x off bar  21x  bar             Row     Red cord  2x10        Ther Ex                       UBE Retro      5'  6'               Doorway Pec Stretch                        NuStep         5' L2              Bike         5'              Deadlift          6\" box  #15 kb  3x8              STS          #15 KB  3x8             Shrug          #15 KB  2x10              Bicep/Tricep          #15 DB  2x10    Green Cord  2x10             Ther Activity                                     "                                   Gait Training                                                                       Modalities                                                                                         [1]   Past Medical History:  Diagnosis Date    Cholecystitis     Obstructive sleep apnea

## 2025-07-25 LAB
DSDNA IGG SERPL IA-ACNC: <0.9 IU/ML (ref ?–15)
NUCLEAR IGG SER IA-RTO: <0.09 RATIO (ref ?–1)

## 2025-07-28 ENCOUNTER — PATIENT MESSAGE (OUTPATIENT)
Dept: NEUROLOGY | Facility: CLINIC | Age: 55
End: 2025-07-28

## 2025-07-31 ENCOUNTER — OFFICE VISIT (OUTPATIENT)
Dept: PHYSICAL THERAPY | Facility: REHABILITATION | Age: 55
End: 2025-07-31
Payer: COMMERCIAL

## 2025-07-31 ENCOUNTER — PATIENT MESSAGE (OUTPATIENT)
Dept: NEUROLOGY | Facility: CLINIC | Age: 55
End: 2025-07-31

## 2025-07-31 DIAGNOSIS — M54.12 RADICULOPATHY, CERVICAL REGION: Primary | ICD-10-CM

## 2025-07-31 PROCEDURE — 97140 MANUAL THERAPY 1/> REGIONS: CPT

## 2025-07-31 PROCEDURE — 97112 NEUROMUSCULAR REEDUCATION: CPT

## 2025-07-31 PROCEDURE — 97110 THERAPEUTIC EXERCISES: CPT

## 2025-08-04 DIAGNOSIS — E53.8 VITAMIN B12 DEFICIENCY: ICD-10-CM

## 2025-08-04 DIAGNOSIS — E55.9 VITAMIN D DEFICIENCY: Primary | ICD-10-CM

## 2025-08-04 DIAGNOSIS — R25.3 MUSCLE FASCICULATION: ICD-10-CM

## 2025-08-04 DIAGNOSIS — R25.3 MUSCLE FASCICULATION: Primary | ICD-10-CM

## 2025-08-05 ENCOUNTER — OFFICE VISIT (OUTPATIENT)
Age: 55
End: 2025-08-05
Payer: COMMERCIAL

## 2025-08-05 VITALS
WEIGHT: 204 LBS | SYSTOLIC BLOOD PRESSURE: 126 MMHG | OXYGEN SATURATION: 98 % | BODY MASS INDEX: 28.56 KG/M2 | HEIGHT: 71 IN | DIASTOLIC BLOOD PRESSURE: 78 MMHG | RESPIRATION RATE: 19 BRPM | HEART RATE: 52 BPM

## 2025-08-05 DIAGNOSIS — Z00.00 ANNUAL PHYSICAL EXAM: Primary | ICD-10-CM

## 2025-08-05 DIAGNOSIS — E55.9 VITAMIN D DEFICIENCY: ICD-10-CM

## 2025-08-05 DIAGNOSIS — Z12.11 SCREENING FOR COLON CANCER: ICD-10-CM

## 2025-08-05 DIAGNOSIS — F41.9 ANXIETY: ICD-10-CM

## 2025-08-05 DIAGNOSIS — R25.3 MUSCLE FASCICULATION: ICD-10-CM

## 2025-08-05 DIAGNOSIS — Z12.5 SCREENING FOR PROSTATE CANCER: ICD-10-CM

## 2025-08-05 DIAGNOSIS — E78.2 MIXED HYPERLIPIDEMIA: ICD-10-CM

## 2025-08-05 PROCEDURE — 99396 PREV VISIT EST AGE 40-64: CPT | Performed by: INTERNAL MEDICINE

## 2025-08-05 RX ORDER — ROSUVASTATIN CALCIUM 5 MG/1
5 TABLET, COATED ORAL DAILY
Qty: 30 TABLET | Refills: 5 | Status: CANCELLED | OUTPATIENT
Start: 2025-08-05

## 2025-08-05 RX ORDER — ESCITALOPRAM OXALATE 10 MG/1
10 TABLET ORAL DAILY
Qty: 30 TABLET | Refills: 5 | Status: SHIPPED | OUTPATIENT
Start: 2025-08-05 | End: 2026-02-01

## 2025-08-05 RX ORDER — ROSUVASTATIN CALCIUM 10 MG/1
10 TABLET, COATED ORAL DAILY
Qty: 30 TABLET | Refills: 5 | Status: SHIPPED | OUTPATIENT
Start: 2025-08-05

## 2025-08-06 ENCOUNTER — APPOINTMENT (OUTPATIENT)
Dept: LAB | Facility: CLINIC | Age: 55
End: 2025-08-06
Payer: COMMERCIAL

## 2025-08-06 LAB
25(OH)D3 SERPL-MCNC: 37.5 NG/ML (ref 30–100)
ALBUMIN SERPL BCG-MCNC: 4.4 G/DL (ref 3.5–5)
ALP SERPL-CCNC: 51 U/L (ref 34–104)
ALT SERPL W P-5'-P-CCNC: 18 U/L (ref 7–52)
ANION GAP SERPL CALCULATED.3IONS-SCNC: 5 MMOL/L (ref 4–13)
AST SERPL W P-5'-P-CCNC: 17 U/L (ref 13–39)
BILIRUB SERPL-MCNC: 0.26 MG/DL (ref 0.2–1)
BUN SERPL-MCNC: 22 MG/DL (ref 5–25)
CALCIUM SERPL-MCNC: 9.3 MG/DL (ref 8.4–10.2)
CHLORIDE SERPL-SCNC: 106 MMOL/L (ref 96–108)
CO2 SERPL-SCNC: 29 MMOL/L (ref 21–32)
CREAT SERPL-MCNC: 0.85 MG/DL (ref 0.6–1.3)
GFR SERPL CREATININE-BSD FRML MDRD: 98 ML/MIN/1.73SQ M
GLUCOSE P FAST SERPL-MCNC: 110 MG/DL (ref 65–99)
MAGNESIUM SERPL-MCNC: 2 MG/DL (ref 1.9–2.7)
POTASSIUM SERPL-SCNC: 4.2 MMOL/L (ref 3.5–5.3)
PROT SERPL-MCNC: 6.9 G/DL (ref 6.4–8.4)
PSA SERPL-MCNC: 0.8 NG/ML (ref 0–4)
SODIUM SERPL-SCNC: 140 MMOL/L (ref 135–147)
VIT B12 SERPL-MCNC: 340 PG/ML (ref 180–914)

## 2025-08-06 PROCEDURE — 82607 VITAMIN B-12: CPT | Performed by: INTERNAL MEDICINE

## 2025-08-06 PROCEDURE — 82306 VITAMIN D 25 HYDROXY: CPT | Performed by: INTERNAL MEDICINE

## 2025-08-06 PROCEDURE — 80053 COMPREHEN METABOLIC PANEL: CPT | Performed by: INTERNAL MEDICINE

## 2025-08-06 PROCEDURE — G0103 PSA SCREENING: HCPCS

## 2025-08-06 PROCEDURE — 36415 COLL VENOUS BLD VENIPUNCTURE: CPT

## 2025-08-06 PROCEDURE — 83735 ASSAY OF MAGNESIUM: CPT | Performed by: INTERNAL MEDICINE

## 2025-08-08 ENCOUNTER — OFFICE VISIT (OUTPATIENT)
Age: 55
End: 2025-08-08
Payer: COMMERCIAL

## 2025-08-08 ENCOUNTER — OFFICE VISIT (OUTPATIENT)
Dept: PHYSICAL THERAPY | Facility: REHABILITATION | Age: 55
End: 2025-08-08
Payer: COMMERCIAL

## 2025-08-08 VITALS — SYSTOLIC BLOOD PRESSURE: 142 MMHG | HEART RATE: 71 BPM | OXYGEN SATURATION: 99 % | DIASTOLIC BLOOD PRESSURE: 86 MMHG

## 2025-08-08 DIAGNOSIS — M54.12 RADICULOPATHY, CERVICAL REGION: Primary | ICD-10-CM

## 2025-08-08 DIAGNOSIS — H81.11 BENIGN PAROXYSMAL POSITIONAL VERTIGO OF RIGHT EAR: Primary | ICD-10-CM

## 2025-08-08 PROCEDURE — 97112 NEUROMUSCULAR REEDUCATION: CPT

## 2025-08-08 PROCEDURE — 99213 OFFICE O/P EST LOW 20 MIN: CPT | Performed by: STUDENT IN AN ORGANIZED HEALTH CARE EDUCATION/TRAINING PROGRAM

## 2025-08-08 RX ORDER — MECLIZINE HYDROCHLORIDE 25 MG/1
25 TABLET ORAL 3 TIMES DAILY PRN
Qty: 30 TABLET | Refills: 1 | Status: SHIPPED | OUTPATIENT
Start: 2025-08-08

## 2025-08-12 ENCOUNTER — TELEPHONE (OUTPATIENT)
Age: 55
End: 2025-08-12